# Patient Record
Sex: FEMALE | Race: WHITE | Employment: UNEMPLOYED | URBAN - METROPOLITAN AREA
[De-identification: names, ages, dates, MRNs, and addresses within clinical notes are randomized per-mention and may not be internally consistent; named-entity substitution may affect disease eponyms.]

---

## 2017-01-18 ENCOUNTER — OFFICE VISIT (OUTPATIENT)
Dept: FAMILY MEDICINE CLINIC | Facility: CLINIC | Age: 47
End: 2017-01-18

## 2017-01-18 VITALS
WEIGHT: 202 LBS | HEART RATE: 84 BPM | BODY MASS INDEX: 30.26 KG/M2 | HEIGHT: 68.5 IN | DIASTOLIC BLOOD PRESSURE: 78 MMHG | SYSTOLIC BLOOD PRESSURE: 122 MMHG | RESPIRATION RATE: 16 BRPM

## 2017-01-18 DIAGNOSIS — E66.9 OBESITY (BMI 30-39.9): ICD-10-CM

## 2017-01-18 DIAGNOSIS — R73.03 PREDIABETES: ICD-10-CM

## 2017-01-18 DIAGNOSIS — Z51.81 ENCOUNTER FOR THERAPEUTIC DRUG MONITORING: Primary | ICD-10-CM

## 2017-01-18 PROCEDURE — 99213 OFFICE O/P EST LOW 20 MIN: CPT | Performed by: INTERNAL MEDICINE

## 2017-01-18 RX ORDER — PHENTERMINE HYDROCHLORIDE 37.5 MG/1
37.5 TABLET ORAL
Qty: 30 TABLET | Refills: 1 | Status: SHIPPED | OUTPATIENT
Start: 2017-01-18 | End: 2017-05-15

## 2017-01-18 NOTE — PROGRESS NOTES
CC: Patient presents with:  Weight Check: up 8 lb       HPI:   Obesity, doing well on phentermine, topamax and metformin. No chest pain.        Current Outpatient Prescriptions:  Phentermine HCl 37.5 MG Oral Tab Take 1 tablet (37.5 mg total) by mouth restart phentermine 37.5mg and give 2 months worth due to problems with getting her in. Will cont topamax and metformin. In 2 months will reduce phentermine to 15mg and consider increasing metformin to 3 tabs.      The patient indicates understanding of the

## 2017-02-15 RX ORDER — TOPIRAMATE 50 MG/1
TABLET, FILM COATED ORAL
Qty: 180 TABLET | Refills: 1 | Status: SHIPPED | OUTPATIENT
Start: 2017-02-15 | End: 2017-06-14

## 2017-02-15 NOTE — TELEPHONE ENCOUNTER
Requesting Topiramate for [facility]  LOV: 1/18/17   RTC: 2m  Last Labs: 5/6/16  Filled: 8/5/16 #60 with 5 refills    Future Appointments  Date Time Provider Fred Hong   3/1/2017 9:00 AM Kaitlin Ibarra MD [facility] [address]   5/15/2017 5:00 PM Kaitlin Ibarra

## 2017-03-01 ENCOUNTER — OFFICE VISIT (OUTPATIENT)
Dept: FAMILY MEDICINE CLINIC | Facility: CLINIC | Age: 47
End: 2017-03-01

## 2017-03-01 VITALS
HEIGHT: 68.5 IN | WEIGHT: 196 LBS | RESPIRATION RATE: 16 BRPM | DIASTOLIC BLOOD PRESSURE: 80 MMHG | HEART RATE: 78 BPM | SYSTOLIC BLOOD PRESSURE: 122 MMHG | BODY MASS INDEX: 29.36 KG/M2

## 2017-03-01 DIAGNOSIS — Z51.81 ENCOUNTER FOR THERAPEUTIC DRUG MONITORING: Primary | ICD-10-CM

## 2017-03-01 DIAGNOSIS — R73.03 PREDIABETES: ICD-10-CM

## 2017-03-01 DIAGNOSIS — E66.9 OBESITY (BMI 30-39.9): ICD-10-CM

## 2017-03-01 PROCEDURE — 99213 OFFICE O/P EST LOW 20 MIN: CPT | Performed by: INTERNAL MEDICINE

## 2017-03-01 RX ORDER — METFORMIN HYDROCHLORIDE 750 MG/1
2250 TABLET, EXTENDED RELEASE ORAL
Qty: 90 TABLET | Refills: 5 | Status: SHIPPED | OUTPATIENT
Start: 2017-03-01 | End: 2017-05-15

## 2017-03-01 RX ORDER — PHENTERMINE HYDROCHLORIDE 15 MG/1
15 CAPSULE ORAL EVERY MORNING
Qty: 30 CAPSULE | Refills: 2 | Status: SHIPPED | OUTPATIENT
Start: 2017-03-01 | End: 2017-06-14

## 2017-03-01 NOTE — PROGRESS NOTES
CC: Patient presents with:  Weight Check: down 6 lb       HPI:   Obesity, doing well on phentermine and metformin, no chest pain. Working out well.        Current Outpatient Prescriptions:  Phentermine HCl 15 MG Oral Cap Take 1 capsule (15 mg total) by PLAN:  1. Obesity/prediabetes, pt with 6 lbs wt loss in 3 months of phentemrine, topamax and metformin. Total wt loss of 39 lbs.  Pt with prediabetes, doing well on metformin. Will reduce phentermine to 15mg and refill for 3 months.   Will cont topamax,

## 2017-05-15 ENCOUNTER — OFFICE VISIT (OUTPATIENT)
Dept: FAMILY MEDICINE CLINIC | Facility: CLINIC | Age: 47
End: 2017-05-15

## 2017-05-15 VITALS
DIASTOLIC BLOOD PRESSURE: 88 MMHG | HEIGHT: 68.5 IN | TEMPERATURE: 98 F | HEART RATE: 72 BPM | SYSTOLIC BLOOD PRESSURE: 130 MMHG | BODY MASS INDEX: 30.54 KG/M2 | WEIGHT: 203.81 LBS | RESPIRATION RATE: 16 BRPM

## 2017-05-15 DIAGNOSIS — R73.03 PREDIABETES: ICD-10-CM

## 2017-05-15 DIAGNOSIS — E66.9 OBESITY (BMI 30-39.9): ICD-10-CM

## 2017-05-15 DIAGNOSIS — E78.00 PURE HYPERCHOLESTEROLEMIA: ICD-10-CM

## 2017-05-15 DIAGNOSIS — E55.9 VITAMIN D DEFICIENCY: ICD-10-CM

## 2017-05-15 DIAGNOSIS — Z01.89 ROUTINE LAB DRAW: ICD-10-CM

## 2017-05-15 DIAGNOSIS — Z51.81 ENCOUNTER FOR THERAPEUTIC DRUG MONITORING: Primary | ICD-10-CM

## 2017-05-15 PROCEDURE — 99213 OFFICE O/P EST LOW 20 MIN: CPT | Performed by: INTERNAL MEDICINE

## 2017-05-15 RX ORDER — METFORMIN HYDROCHLORIDE 750 MG/1
1500 TABLET, EXTENDED RELEASE ORAL
COMMUNITY
End: 2017-11-08 | Stop reason: ALTCHOICE

## 2017-05-15 RX ORDER — PHENTERMINE HYDROCHLORIDE 37.5 MG/1
37.5 TABLET ORAL
Qty: 30 TABLET | Refills: 0 | Status: SHIPPED | OUTPATIENT
Start: 2017-05-15 | End: 2017-06-14

## 2017-05-15 NOTE — PROGRESS NOTES
CC: Patient presents with:  Weight Check: up 7lb - Patient has started taking Metformin 750mg 2 tabs since 5/8/17 as she was experiencing diarrhea since starting the 3 tabs daily. HPI:   Obesity, doing well phentermine, topamax and metfomrin.  Steven File 5/15/2018  Lipid Panel  Standing Status: Future  Standing Expiration Date: 5/15/2018  Urinalysis, Routine  Standing Status: Future  Standing Expiration Date: 5/15/2018  Vitamin D, 25-Hydroxy  Standing Status: Future  Standing Expiration Date: 5/15/2018  Vi

## 2017-05-18 ENCOUNTER — PRIOR ORIGINAL RECORDS (OUTPATIENT)
Dept: OTHER | Age: 47
End: 2017-05-18

## 2017-05-18 ENCOUNTER — LAB ENCOUNTER (OUTPATIENT)
Dept: LAB | Age: 47
End: 2017-05-18
Attending: INTERNAL MEDICINE
Payer: COMMERCIAL

## 2017-05-18 DIAGNOSIS — E55.9 VITAMIN D DEFICIENCY: ICD-10-CM

## 2017-05-18 DIAGNOSIS — R73.03 PREDIABETES: ICD-10-CM

## 2017-05-18 DIAGNOSIS — E78.00 PURE HYPERCHOLESTEROLEMIA: ICD-10-CM

## 2017-05-18 DIAGNOSIS — E66.9 OBESITY (BMI 30-39.9): ICD-10-CM

## 2017-05-18 DIAGNOSIS — Z51.81 ENCOUNTER FOR THERAPEUTIC DRUG MONITORING: ICD-10-CM

## 2017-05-18 DIAGNOSIS — Z01.89 ROUTINE LAB DRAW: ICD-10-CM

## 2017-05-18 PROCEDURE — 82306 VITAMIN D 25 HYDROXY: CPT | Performed by: INTERNAL MEDICINE

## 2017-05-18 PROCEDURE — 81003 URINALYSIS AUTO W/O SCOPE: CPT | Performed by: INTERNAL MEDICINE

## 2017-05-18 PROCEDURE — 82607 VITAMIN B-12: CPT | Performed by: INTERNAL MEDICINE

## 2017-05-18 PROCEDURE — 36415 COLL VENOUS BLD VENIPUNCTURE: CPT | Performed by: INTERNAL MEDICINE

## 2017-05-18 PROCEDURE — 80061 LIPID PANEL: CPT | Performed by: INTERNAL MEDICINE

## 2017-05-18 PROCEDURE — 80050 GENERAL HEALTH PANEL: CPT | Performed by: INTERNAL MEDICINE

## 2017-05-24 LAB
ALBUMIN: 3.8 G/DL
ALKALINE PHOSPHATATE(ALK PHOS): 52 IU/L
BILIRUBIN TOTAL: 0.4 MG/DL
BUN: 13 MG/DL
CALCIUM: 9.5 MG/DL
CHLORIDE: 104 MEQ/L
CHOLESTEROL, TOTAL: 244 MG/DL
CREATININE, SERUM: 1.05 MG/DL
GLUCOSE: 88 MG/DL
HDL CHOLESTEROL: 62 MG/DL
LDL CHOLESTEROL: 139 MG/DL
POTASSIUM, SERUM: 4.1 MEQ/L
PROTEIN, TOTAL: 7.4 G/DL
SGOT (AST): 30 IU/L
SGPT (ALT): 48 IU/L
SODIUM: 138 MEQ/L
TRIGLYCERIDES: 214 MG/DL

## 2017-05-29 NOTE — PROGRESS NOTES
Quick Note:    Low vit D, start vit D 50,000 U once weekly for 6 months.  Low B12, high lipids, will discuss next ov.  ______

## 2017-05-30 ENCOUNTER — PRIOR ORIGINAL RECORDS (OUTPATIENT)
Dept: OTHER | Age: 47
End: 2017-05-30

## 2017-05-31 RX ORDER — ERGOCALCIFEROL 1.25 MG/1
50000 CAPSULE ORAL WEEKLY
Qty: 24 CAPSULE | Refills: 0 | Status: SHIPPED | OUTPATIENT
Start: 2017-05-31 | End: 2017-06-30

## 2017-06-01 ENCOUNTER — MED REC SCAN ONLY (OUTPATIENT)
Dept: FAMILY MEDICINE CLINIC | Facility: CLINIC | Age: 47
End: 2017-06-01

## 2017-06-13 ENCOUNTER — TELEPHONE (OUTPATIENT)
Dept: FAMILY MEDICINE CLINIC | Facility: CLINIC | Age: 47
End: 2017-06-13

## 2017-06-14 ENCOUNTER — OFFICE VISIT (OUTPATIENT)
Dept: FAMILY MEDICINE CLINIC | Facility: CLINIC | Age: 47
End: 2017-06-14

## 2017-06-14 ENCOUNTER — LAB ENCOUNTER (OUTPATIENT)
Dept: LAB | Age: 47
End: 2017-06-14
Attending: INTERNAL MEDICINE
Payer: COMMERCIAL

## 2017-06-14 VITALS
WEIGHT: 196 LBS | HEART RATE: 84 BPM | BODY MASS INDEX: 29.36 KG/M2 | DIASTOLIC BLOOD PRESSURE: 78 MMHG | SYSTOLIC BLOOD PRESSURE: 122 MMHG | RESPIRATION RATE: 16 BRPM | HEIGHT: 68.5 IN

## 2017-06-14 DIAGNOSIS — E66.9 OBESITY (BMI 30-39.9): ICD-10-CM

## 2017-06-14 DIAGNOSIS — E53.8 VITAMIN B 12 DEFICIENCY: ICD-10-CM

## 2017-06-14 DIAGNOSIS — R73.03 PREDIABETES: ICD-10-CM

## 2017-06-14 DIAGNOSIS — E78.00 PURE HYPERCHOLESTEROLEMIA: ICD-10-CM

## 2017-06-14 DIAGNOSIS — E78.00 PURE HYPERCHOLESTEROLEMIA: Primary | ICD-10-CM

## 2017-06-14 DIAGNOSIS — E55.9 VITAMIN D DEFICIENCY: ICD-10-CM

## 2017-06-14 DIAGNOSIS — Z51.81 ENCOUNTER FOR THERAPEUTIC DRUG MONITORING: Primary | ICD-10-CM

## 2017-06-14 PROCEDURE — 99214 OFFICE O/P EST MOD 30 MIN: CPT | Performed by: INTERNAL MEDICINE

## 2017-06-14 PROCEDURE — 96372 THER/PROPH/DIAG INJ SC/IM: CPT | Performed by: INTERNAL MEDICINE

## 2017-06-14 RX ORDER — CYANOCOBALAMIN 1000 UG/ML
1000 INJECTION INTRAMUSCULAR; SUBCUTANEOUS ONCE
Status: COMPLETED | OUTPATIENT
Start: 2017-06-14 | End: 2017-06-14

## 2017-06-14 RX ORDER — PHENTERMINE HYDROCHLORIDE 37.5 MG/1
37.5 TABLET ORAL
Qty: 30 TABLET | Refills: 0 | Status: SHIPPED | OUTPATIENT
Start: 2017-06-14 | End: 2017-07-14

## 2017-06-14 RX ADMIN — CYANOCOBALAMIN 1000 MCG: 1000 INJECTION INTRAMUSCULAR; SUBCUTANEOUS at 08:39:00

## 2017-06-14 NOTE — PROGRESS NOTES
CC: Patient presents with:  Weight Check: down 7 lb       HPI:   1. Obesity/prediabetes, pt doing well on phentermine, topamax and metformin. 2. Vitamin B 12 deficiency, she feels fine  3. Vitamin D deficiency, doing well on vit D  4.  Pure hypercho diagnosis)  Obesity (BMI 30-39. 9)  Vitamin B 12 deficiency  Vitamin D deficiency  Prediabetes  Pure hypercholesterolemia     PLAN:  1. Obesity/prediabetes, pt with 7 lbs wt loss in 1 month of phentermine 37.5mg, topamax and metformin.  Total wt loss of 39 l

## 2017-06-28 ENCOUNTER — TELEPHONE (OUTPATIENT)
Dept: FAMILY MEDICINE CLINIC | Facility: CLINIC | Age: 47
End: 2017-06-28

## 2017-06-28 NOTE — TELEPHONE ENCOUNTER
Singulex results received. Per Dr. Sr Shell: lipids okay.      Future Appointments  Date Time Provider Fred Hong   7/14/2017 2:15 PM Alexander Grewal MD [facility] [address]   8/11/2017 2:15 PM Alexander Grewal MD [facility] [address]   9/18/2017 1:00 PM Nova

## 2017-07-14 ENCOUNTER — OFFICE VISIT (OUTPATIENT)
Dept: INTERNAL MEDICINE CLINIC | Facility: CLINIC | Age: 47
End: 2017-07-14

## 2017-07-14 VITALS
HEIGHT: 68.5 IN | DIASTOLIC BLOOD PRESSURE: 80 MMHG | WEIGHT: 199 LBS | RESPIRATION RATE: 16 BRPM | HEART RATE: 78 BPM | SYSTOLIC BLOOD PRESSURE: 122 MMHG | BODY MASS INDEX: 29.81 KG/M2

## 2017-07-14 DIAGNOSIS — E66.9 OBESITY (BMI 30-39.9): ICD-10-CM

## 2017-07-14 DIAGNOSIS — E78.00 PURE HYPERCHOLESTEROLEMIA: ICD-10-CM

## 2017-07-14 DIAGNOSIS — E53.8 VITAMIN B12 DEFICIENCY: ICD-10-CM

## 2017-07-14 DIAGNOSIS — I10 HTN, GOAL BELOW 140/90: ICD-10-CM

## 2017-07-14 DIAGNOSIS — R73.03 PREDIABETES: ICD-10-CM

## 2017-07-14 DIAGNOSIS — Z51.81 ENCOUNTER FOR THERAPEUTIC DRUG MONITORING: Primary | ICD-10-CM

## 2017-07-14 PROCEDURE — 99214 OFFICE O/P EST MOD 30 MIN: CPT | Performed by: INTERNAL MEDICINE

## 2017-07-14 PROCEDURE — 96372 THER/PROPH/DIAG INJ SC/IM: CPT | Performed by: INTERNAL MEDICINE

## 2017-07-14 RX ORDER — CYANOCOBALAMIN 1000 UG/ML
1000 INJECTION INTRAMUSCULAR; SUBCUTANEOUS ONCE
Status: COMPLETED | OUTPATIENT
Start: 2017-07-14 | End: 2017-07-14

## 2017-07-14 RX ORDER — VALSARTAN 160 MG/1
160 TABLET ORAL DAILY
Qty: 90 TABLET | Refills: 3 | Status: SHIPPED | OUTPATIENT
Start: 2017-07-14 | End: 2017-08-11

## 2017-07-14 RX ORDER — PHENTERMINE HYDROCHLORIDE 37.5 MG/1
37.5 TABLET ORAL
Qty: 30 TABLET | Refills: 0 | Status: SHIPPED | OUTPATIENT
Start: 2017-07-14 | End: 2017-11-08

## 2017-07-14 RX ADMIN — CYANOCOBALAMIN 1000 MCG: 1000 INJECTION INTRAMUSCULAR; SUBCUTANEOUS at 15:39:00

## 2017-07-14 NOTE — PROGRESS NOTES
CC: Patient presents with:  Weight Check: up 3 lb       HPI:   1. Obesity/prediabetes, pt doing well on phentermine, topamax and metformin. No chest pain. 2. Vitamin B 12 deficiency, doing well on B12 shots  3.  Pure hypercholesterolemia, went for l mouth daily. valsartan (DIOVAN) 160 MG Oral Tab 90 tablet 3      Sig: Take 1 tablet (160 mg total) by mouth daily. None     ASSESSMENT:   Encounter for therapeutic drug monitoring  (primary encounter diagnosis)  Obesity (BMI 30-39. 9)  Vitamin

## 2017-08-11 ENCOUNTER — OFFICE VISIT (OUTPATIENT)
Dept: INTERNAL MEDICINE CLINIC | Facility: CLINIC | Age: 47
End: 2017-08-11

## 2017-08-11 VITALS
BODY MASS INDEX: 30.26 KG/M2 | RESPIRATION RATE: 16 BRPM | HEART RATE: 84 BPM | HEIGHT: 68.5 IN | DIASTOLIC BLOOD PRESSURE: 82 MMHG | SYSTOLIC BLOOD PRESSURE: 122 MMHG | WEIGHT: 202 LBS

## 2017-08-11 DIAGNOSIS — I10 HTN, GOAL BELOW 140/90: ICD-10-CM

## 2017-08-11 DIAGNOSIS — R73.03 PREDIABETES: ICD-10-CM

## 2017-08-11 DIAGNOSIS — E66.9 OBESITY (BMI 30-39.9): ICD-10-CM

## 2017-08-11 DIAGNOSIS — Z51.81 ENCOUNTER FOR THERAPEUTIC DRUG MONITORING: Primary | ICD-10-CM

## 2017-08-11 DIAGNOSIS — E53.8 LOW VITAMIN B12 LEVEL: ICD-10-CM

## 2017-08-11 PROCEDURE — 99214 OFFICE O/P EST MOD 30 MIN: CPT | Performed by: INTERNAL MEDICINE

## 2017-08-11 PROCEDURE — 96372 THER/PROPH/DIAG INJ SC/IM: CPT | Performed by: INTERNAL MEDICINE

## 2017-08-11 RX ORDER — PHENTERMINE HYDROCHLORIDE 15 MG/1
15 CAPSULE ORAL EVERY MORNING
Qty: 30 CAPSULE | Refills: 2 | Status: SHIPPED | OUTPATIENT
Start: 2017-08-11 | End: 2017-11-08

## 2017-08-11 RX ORDER — PHENTERMINE HYDROCHLORIDE 37.5 MG/1
37.5 TABLET ORAL
Qty: 30 TABLET | Refills: 0 | Status: CANCELLED | OUTPATIENT
Start: 2017-08-11

## 2017-08-11 RX ORDER — ERGOCALCIFEROL 1.25 MG/1
1 CAPSULE ORAL WEEKLY
Refills: 0 | COMMUNITY
Start: 2017-07-21 | End: 2018-02-05

## 2017-08-11 RX ORDER — CYANOCOBALAMIN 1000 UG/ML
1000 INJECTION INTRAMUSCULAR; SUBCUTANEOUS ONCE
Status: COMPLETED | OUTPATIENT
Start: 2017-08-11 | End: 2017-08-11

## 2017-08-11 RX ADMIN — CYANOCOBALAMIN 1000 MCG: 1000 INJECTION INTRAMUSCULAR; SUBCUTANEOUS at 15:03:00

## 2017-08-11 NOTE — PROGRESS NOTES
CC: Patient presents with:  Weight Check: up 3 lbs       HPI:   1. Obesity/prediabetes, pt doing well on phentermine, topamax and metformin. Working out well. 2. Vitamin B 12 deficiency, doing well on B12 shots  3.  HTN, doing well on diovan, no diz tablet by mouth daily. Phentermine HCl 15 MG Oral Cap 30 capsule 2      Sig: Take 1 capsule (15 mg total) by mouth every morning.           None     ASSESSMENT:   Encounter for therapeutic drug monitoring  (primary encounter diagnosis)  Obesity (BMI 30

## 2017-08-17 RX ORDER — TOPIRAMATE 50 MG/1
TABLET, FILM COATED ORAL
Qty: 180 TABLET | Refills: 1 | Status: SHIPPED | OUTPATIENT
Start: 2017-08-17 | End: 2017-11-08 | Stop reason: ALTCHOICE

## 2017-08-17 NOTE — TELEPHONE ENCOUNTER
Requesting topiramate  LOV: 8/11/17  RTC: 3 months  Last Labs: n/a  Filled: 2/15/17 #180 with 1 refill  Future Appointments  Date Time Provider Fred Hong   9/8/2017 11:30 AM [facility] 20 NURSE [facility] [address] [facility] [address]   10/6/2017 11:30 AM EM[facility] 20 NURSE [facility] 20

## 2017-08-23 RX ORDER — METFORMIN HYDROCHLORIDE 750 MG/1
1500 TABLET, EXTENDED RELEASE ORAL
Qty: 180 TABLET | Refills: 1 | Status: SHIPPED | OUTPATIENT
Start: 2017-08-23 | End: 2017-11-08

## 2017-08-23 NOTE — TELEPHONE ENCOUNTER
Diabetic Medication Protocol Failed8/21 12:55 AM   HgBA1C procedure resulted in past 6 months    Last HgBA1C < 7.5    Microalbumin procedure in past 12 months or taking ACE/ARB     Requesting Metformin   LOV: 8/11/17  RTC: 3 mo   Last Labs: charla   Filled: 3

## 2017-09-11 ENCOUNTER — NURSE ONLY (OUTPATIENT)
Dept: FAMILY MEDICINE CLINIC | Facility: CLINIC | Age: 47
End: 2017-09-11

## 2017-09-11 DIAGNOSIS — E53.8 VITAMIN B12 DEFICIENCY: ICD-10-CM

## 2017-09-11 PROCEDURE — 96372 THER/PROPH/DIAG INJ SC/IM: CPT | Performed by: INTERNAL MEDICINE

## 2017-09-11 RX ORDER — CYANOCOBALAMIN 1000 UG/ML
1000 INJECTION INTRAMUSCULAR; SUBCUTANEOUS ONCE
Status: COMPLETED | OUTPATIENT
Start: 2017-09-11 | End: 2017-09-11

## 2017-09-11 RX ADMIN — CYANOCOBALAMIN 1000 MCG: 1000 INJECTION INTRAMUSCULAR; SUBCUTANEOUS at 13:24:00

## 2017-09-11 NOTE — PROGRESS NOTES
Patient is here for B12. Given IM left deltoid. Patient tolerated well. All questions answered.      Future Appointments  Date Time Provider Fred Gely   10/10/2017 11:30 AM [facility] 20 NURSE [facility] [address] [facility] [address]   11/8/2017 12:40 PM Francisco Winters MD [facility] 2

## 2017-10-10 ENCOUNTER — NURSE ONLY (OUTPATIENT)
Dept: FAMILY MEDICINE CLINIC | Facility: CLINIC | Age: 47
End: 2017-10-10

## 2017-10-10 PROCEDURE — 96372 THER/PROPH/DIAG INJ SC/IM: CPT | Performed by: INTERNAL MEDICINE

## 2017-10-10 RX ORDER — CYANOCOBALAMIN 1000 UG/ML
1000 INJECTION INTRAMUSCULAR; SUBCUTANEOUS
Status: SHIPPED | OUTPATIENT
Start: 2017-10-10 | End: 2017-12-09

## 2017-10-10 RX ADMIN — CYANOCOBALAMIN 1000 MCG: 1000 INJECTION INTRAMUSCULAR; SUBCUTANEOUS at 11:20:00

## 2017-10-10 NOTE — PROGRESS NOTES
Notes Recorded by Denny Leach MD on 5/29/2017 at 1:21 PM  Low vit D, start vit D 50,000 U once weekly for 6 months. Low B12, high lipids, will discuss next ov.     Ref Range & Units 5/18/17  8:30 AM   Vitamin B12 193 - 986 pg/mL 324            #1 6/14/1

## 2017-11-08 ENCOUNTER — OFFICE VISIT (OUTPATIENT)
Dept: FAMILY MEDICINE CLINIC | Facility: CLINIC | Age: 47
End: 2017-11-08

## 2017-11-08 VITALS
TEMPERATURE: 99 F | WEIGHT: 202 LBS | RESPIRATION RATE: 18 BRPM | HEART RATE: 101 BPM | OXYGEN SATURATION: 99 % | HEIGHT: 69 IN | BODY MASS INDEX: 29.92 KG/M2 | SYSTOLIC BLOOD PRESSURE: 112 MMHG | DIASTOLIC BLOOD PRESSURE: 78 MMHG

## 2017-11-08 DIAGNOSIS — E66.9 OBESITY (BMI 30-39.9): ICD-10-CM

## 2017-11-08 DIAGNOSIS — E55.9 VITAMIN D DEFICIENCY: ICD-10-CM

## 2017-11-08 DIAGNOSIS — R73.03 PREDIABETES: Primary | ICD-10-CM

## 2017-11-08 PROCEDURE — 99214 OFFICE O/P EST MOD 30 MIN: CPT | Performed by: FAMILY MEDICINE

## 2017-11-08 RX ORDER — EMPAGLIFLOZIN 25 MG/1
TABLET, FILM COATED ORAL
Refills: 4 | COMMUNITY
Start: 2017-11-05 | End: 2017-11-08

## 2017-11-08 RX ORDER — FLUTICASONE PROPIONATE 50 MCG
SPRAY, SUSPENSION (ML) NASAL
Refills: 0 | COMMUNITY
Start: 2017-11-07 | End: 2018-02-05 | Stop reason: ALTCHOICE

## 2017-11-08 RX ORDER — ONDANSETRON 4 MG/1
TABLET, ORALLY DISINTEGRATING ORAL
Refills: 0 | COMMUNITY
Start: 2017-11-07 | End: 2018-02-05 | Stop reason: ALTCHOICE

## 2017-11-08 RX ORDER — VALSARTAN 160 MG/1
160 TABLET ORAL DAILY
Refills: 2 | COMMUNITY
Start: 2017-11-05 | End: 2018-11-02 | Stop reason: ALTCHOICE

## 2017-11-08 NOTE — PROGRESS NOTES
HPI:    Patient ID: Adamaris Martini is a 41year old female. HPI  Mrs. Mk Pérez is a pleasant 44-year-old female with history of hypertension, prediabetes, and obesity here today for a scheduled follow-up.   She is able to maintain her weight at this point She has no cervical adenopathy. Neurological: She is alert. Psychiatric: She has a normal mood and affect. Her behavior is normal.   Vitals reviewed. ASSESSMENT/PLAN:   Prediabetes  (primary encounter diagnosis)  Obesity (bmi 30-39. 9)  Oz

## 2017-12-06 ENCOUNTER — TELEPHONE (OUTPATIENT)
Dept: FAMILY MEDICINE CLINIC | Facility: CLINIC | Age: 47
End: 2017-12-06

## 2017-12-12 ENCOUNTER — PRIOR ORIGINAL RECORDS (OUTPATIENT)
Dept: OTHER | Age: 47
End: 2017-12-12

## 2018-01-26 ENCOUNTER — LAB ENCOUNTER (OUTPATIENT)
Dept: LAB | Age: 48
End: 2018-01-26
Attending: FAMILY MEDICINE
Payer: COMMERCIAL

## 2018-01-26 ENCOUNTER — PRIOR ORIGINAL RECORDS (OUTPATIENT)
Dept: OTHER | Age: 48
End: 2018-01-26

## 2018-01-26 DIAGNOSIS — E55.9 VITAMIN D DEFICIENCY: Primary | ICD-10-CM

## 2018-01-26 DIAGNOSIS — E78.01 FAMILIAL HYPERCHOLESTEROLEMIA: ICD-10-CM

## 2018-01-26 DIAGNOSIS — E55.9 VITAMIN D DEFICIENCY: ICD-10-CM

## 2018-01-26 DIAGNOSIS — E78.00 PURE HYPERCHOLESTEROLEMIA: Primary | ICD-10-CM

## 2018-01-26 DIAGNOSIS — R73.03 PREDIABETES: ICD-10-CM

## 2018-01-26 LAB
25-HYDROXYVITAMIN D (TOTAL): 28.8 NG/ML (ref 30–100)
ALBUMIN SERPL-MCNC: 3.8 G/DL (ref 3.5–4.8)
ALP LIVER SERPL-CCNC: 56 U/L (ref 37–98)
ALT SERPL-CCNC: 27 U/L (ref 14–54)
AST SERPL-CCNC: 10 U/L (ref 15–41)
BILIRUB SERPL-MCNC: 0.4 MG/DL (ref 0.1–2)
BUN BLD-MCNC: 20 MG/DL (ref 8–20)
CALCIUM BLD-MCNC: 9 MG/DL (ref 8.3–10.3)
CHLORIDE: 109 MMOL/L (ref 101–111)
CHOLEST SMN-MCNC: 246 MG/DL (ref ?–200)
CO2: 26 MMOL/L (ref 22–32)
CREAT BLD-MCNC: 0.97 MG/DL (ref 0.55–1.02)
EST. AVERAGE GLUCOSE BLD GHB EST-MCNC: 111 MG/DL (ref 68–126)
GLUCOSE BLD-MCNC: 92 MG/DL (ref 70–99)
HBA1C MFR BLD HPLC: 5.5 % (ref ?–5.7)
HDLC SERPL-MCNC: 59 MG/DL (ref 45–?)
HDLC SERPL: 4.17 {RATIO} (ref ?–4.44)
LDLC SERPL CALC-MCNC: 159 MG/DL (ref ?–130)
M PROTEIN MFR SERPL ELPH: 7.7 G/DL (ref 6.1–8.3)
NONHDLC SERPL-MCNC: 187 MG/DL (ref ?–130)
POTASSIUM SERPL-SCNC: 4.3 MMOL/L (ref 3.6–5.1)
SODIUM SERPL-SCNC: 142 MMOL/L (ref 136–144)
TRIGL SERPL-MCNC: 140 MG/DL (ref ?–150)
VLDLC SERPL CALC-MCNC: 28 MG/DL (ref 5–40)

## 2018-01-26 PROCEDURE — 80061 LIPID PANEL: CPT | Performed by: FAMILY MEDICINE

## 2018-01-26 PROCEDURE — 83036 HEMOGLOBIN GLYCOSYLATED A1C: CPT | Performed by: FAMILY MEDICINE

## 2018-01-26 PROCEDURE — 36415 COLL VENOUS BLD VENIPUNCTURE: CPT | Performed by: FAMILY MEDICINE

## 2018-01-26 PROCEDURE — 80053 COMPREHEN METABOLIC PANEL: CPT | Performed by: FAMILY MEDICINE

## 2018-01-26 PROCEDURE — 82306 VITAMIN D 25 HYDROXY: CPT | Performed by: FAMILY MEDICINE

## 2018-01-26 RX ORDER — ERGOCALCIFEROL 1.25 MG/1
50000 CAPSULE ORAL WEEKLY
Qty: 8 CAPSULE | Refills: 0 | Status: SHIPPED | OUTPATIENT
Start: 2018-01-26 | End: 2018-02-25

## 2018-01-30 LAB
ALBUMIN: 3.8 G/DL
ALKALINE PHOSPHATATE(ALK PHOS): 56 IU/L
ALT (SGPT): 27 U/L
AST (SGOT): 10 U/L
BILIRUBIN TOTAL: 0.4 MG/DL
BUN: 20 MG/DL
CALCIUM: 9 MG/DL
CHLORIDE: 109 MEQ/L
CHOLESTEROL, TOTAL: 246 MG/DL
CREATININE, SERUM: 0.97 MG/DL
GLUCOSE: 92 MG/DL
GLUCOSE: 92 MG/DL
HDL CHOLESTEROL: 59 MG/DL
HEMOGLOBIN A1C: 5.5 %
LDL CHOLESTEROL: 159 MG/DL
NON-HDL CHOLESTEROL: 187 MG/DL
POTASSIUM, SERUM: 4.3 MEQ/L
PROTEIN, TOTAL: 7.7 G/DL
SGOT (AST): 10 IU/L
SGPT (ALT): 27 IU/L
SODIUM: 142 MEQ/L
TOTAL CHOLESTEROL / HDL RATIO: 4.17 RATIO UN
TRIGLYCERIDES: 140 MG/DL

## 2018-01-31 ENCOUNTER — PRIOR ORIGINAL RECORDS (OUTPATIENT)
Dept: OTHER | Age: 48
End: 2018-01-31

## 2018-01-31 RX ORDER — EMPAGLIFLOZIN 25 MG/1
1 TABLET, FILM COATED ORAL DAILY
Qty: 30 TABLET | Refills: 5 | OUTPATIENT
Start: 2018-01-31 | End: 2018-03-02

## 2018-02-05 ENCOUNTER — OFFICE VISIT (OUTPATIENT)
Dept: FAMILY MEDICINE CLINIC | Facility: CLINIC | Age: 48
End: 2018-02-05

## 2018-02-05 VITALS
HEIGHT: 69 IN | RESPIRATION RATE: 16 BRPM | BODY MASS INDEX: 31.55 KG/M2 | WEIGHT: 213 LBS | SYSTOLIC BLOOD PRESSURE: 120 MMHG | DIASTOLIC BLOOD PRESSURE: 80 MMHG | HEART RATE: 70 BPM | TEMPERATURE: 98 F

## 2018-02-05 DIAGNOSIS — E66.9 OBESITY (BMI 30-39.9): Primary | ICD-10-CM

## 2018-02-05 DIAGNOSIS — E55.9 VITAMIN D DEFICIENCY: ICD-10-CM

## 2018-02-05 DIAGNOSIS — Z12.31 ENCOUNTER FOR SCREENING MAMMOGRAM FOR BREAST CANCER: ICD-10-CM

## 2018-02-05 DIAGNOSIS — I10 HTN, GOAL BELOW 140/90: ICD-10-CM

## 2018-02-05 PROCEDURE — 99214 OFFICE O/P EST MOD 30 MIN: CPT | Performed by: FAMILY MEDICINE

## 2018-02-05 RX ORDER — ATORVASTATIN CALCIUM 20 MG/1
20 TABLET, FILM COATED ORAL NIGHTLY
Refills: 1 | COMMUNITY
Start: 2018-01-31

## 2018-02-05 RX ORDER — PHENTERMINE HYDROCHLORIDE 37.5 MG/1
37.5 TABLET ORAL
Qty: 30 TABLET | Refills: 2 | Status: SHIPPED | OUTPATIENT
Start: 2018-02-05 | End: 2018-05-05

## 2018-02-05 NOTE — PATIENT INSTRUCTIONS
Thank you for choosing Morenita Berger MD at [place] [address]  To Do: Glendy Griggs  1. Please take meds as directed  Effective 6/19/17 until November 2017  Due to [employer] is being moved.   It is inside the [facility] is your duty and for your safety to discuss with the pharmacist and our office with questions, and to notify us and stop treatment if problems arise, but know that our intention is that the benefits outweigh those potential risks and we strive to make you

## 2018-02-05 NOTE — PROGRESS NOTES
HPI:    Patient ID: Bari Aguilera is a 41year old female. HPI  Ms. Noreen Abdi is a pleasant 53-year-old female who has history of hypertension, dyslipidemia recently started on atorvastatin, obesity following up for weight loss management.   She had labor mg by mouth nightly. at bedtime Disp:  Rfl: 1     Allergies:No Known Allergies   PHYSICAL EXAM:   Physical Exam   Constitutional: No distress. HENT:   Mouth/Throat: Oropharynx is clear and moist. No oropharyngeal exudate.    Eyes: Conjunctivae are normal.

## 2018-02-19 ENCOUNTER — PRIOR ORIGINAL RECORDS (OUTPATIENT)
Dept: OTHER | Age: 48
End: 2018-02-19

## 2018-02-19 ENCOUNTER — HOSPITAL ENCOUNTER (OUTPATIENT)
Dept: CT IMAGING | Facility: HOSPITAL | Age: 48
Discharge: HOME | End: 2018-02-19
Attending: INTERNAL MEDICINE

## 2018-02-19 DIAGNOSIS — Z13.6 ENCOUNTER FOR SPECIAL SCREENING EXAMINATION FOR CARDIOVASCULAR DISORDER: ICD-10-CM

## 2018-02-23 LAB — UFCT: 12.64 CA SCORE

## 2018-02-27 ENCOUNTER — PRIOR ORIGINAL RECORDS (OUTPATIENT)
Dept: OTHER | Age: 48
End: 2018-02-27

## 2018-02-27 RX ORDER — TOPIRAMATE 50 MG/1
TABLET, FILM COATED ORAL
Qty: 180 TABLET | Refills: 1 | Status: SHIPPED | OUTPATIENT
Start: 2018-02-27 | End: 2018-05-05

## 2018-02-27 NOTE — TELEPHONE ENCOUNTER
TOPIRAMATE 50 MG TABLET  Will file in chart as: TOPIRAMATE 50 MG Oral Tab  The source prescription was discontinued on 11/8/2017 by Jean Marques CMA for the following reason: Therapy completed.   TAKE 1 TABLET BY MOUTH TWICE A DAY       Disp: 180 tablet

## 2018-02-28 ENCOUNTER — MED REC SCAN ONLY (OUTPATIENT)
Dept: FAMILY MEDICINE CLINIC | Facility: CLINIC | Age: 48
End: 2018-02-28

## 2018-03-02 ENCOUNTER — PRIOR ORIGINAL RECORDS (OUTPATIENT)
Dept: OTHER | Age: 48
End: 2018-03-02

## 2018-05-05 ENCOUNTER — PRIOR ORIGINAL RECORDS (OUTPATIENT)
Dept: OTHER | Age: 48
End: 2018-05-05

## 2018-05-05 ENCOUNTER — OFFICE VISIT (OUTPATIENT)
Dept: FAMILY MEDICINE CLINIC | Facility: CLINIC | Age: 48
End: 2018-05-05

## 2018-05-05 ENCOUNTER — LAB ENCOUNTER (OUTPATIENT)
Dept: LAB | Age: 48
End: 2018-05-05
Attending: INTERNAL MEDICINE
Payer: COMMERCIAL

## 2018-05-05 VITALS
DIASTOLIC BLOOD PRESSURE: 88 MMHG | BODY MASS INDEX: 31.84 KG/M2 | RESPIRATION RATE: 16 BRPM | WEIGHT: 215 LBS | SYSTOLIC BLOOD PRESSURE: 130 MMHG | HEIGHT: 69 IN | HEART RATE: 78 BPM | TEMPERATURE: 98 F

## 2018-05-05 DIAGNOSIS — E55.9 VITAMIN D DEFICIENCY: ICD-10-CM

## 2018-05-05 DIAGNOSIS — E78.01 ESSENTIAL FAMILIAL HYPERCHOLESTEROLEMIA: Primary | ICD-10-CM

## 2018-05-05 DIAGNOSIS — I25.10 CORONARY ARTERY DISEASE INVOLVING NATIVE HEART WITHOUT ANGINA PECTORIS, UNSPECIFIED VESSEL OR LESION TYPE: Primary | ICD-10-CM

## 2018-05-05 DIAGNOSIS — R91.1 PULMONARY NODULE: ICD-10-CM

## 2018-05-05 DIAGNOSIS — I10 ESSENTIAL HYPERTENSION: ICD-10-CM

## 2018-05-05 DIAGNOSIS — E66.9 OBESITY (BMI 30-39.9): ICD-10-CM

## 2018-05-05 DIAGNOSIS — M25.50 ARTHRALGIA, UNSPECIFIED JOINT: ICD-10-CM

## 2018-05-05 PROCEDURE — 82306 VITAMIN D 25 HYDROXY: CPT | Performed by: FAMILY MEDICINE

## 2018-05-05 PROCEDURE — 99214 OFFICE O/P EST MOD 30 MIN: CPT | Performed by: FAMILY MEDICINE

## 2018-05-05 RX ORDER — PHENTERMINE HYDROCHLORIDE 37.5 MG/1
37.5 TABLET ORAL
Qty: 30 TABLET | Refills: 2 | Status: SHIPPED | OUTPATIENT
Start: 2018-05-05 | End: 2018-11-02 | Stop reason: ALTCHOICE

## 2018-05-05 NOTE — PROGRESS NOTES
HPI:    Patient ID: Jose E Rivera is a 41year old female. HPI  Ms. Jennifer Freitas is a pleasant 53-year-old female with history of hypertension, hyperlipidemia who recently had CT cardiac scoring which revealed mild arteriolosclerosis of the coronary artery. by mouth daily. Disp:  Rfl: 2   topiramate 50 MG Oral Tab Take 1 tablet (50 mg total) by mouth 2 (two) times daily. Disp: 60 tablet Rfl: 5   aspirin 81 MG Oral Tab Take 81 mg by mouth daily.  Disp:  Rfl:      Allergies:No Known Allergies   PHYSICAL EXAM: Imaging & Referrals:  RHEUMATOLOGY - INTERNAL  CT CHEST (W+WO) (CPT=71270)       VO#[id number]

## 2018-05-05 NOTE — PATIENT INSTRUCTIONS
Thank you for choosing Elvia Beaver MD at [place] [address]  To Do: Shiva Villa  1. Please take meds as directed  [facility] is located in [address]. Monday, Tuesday & Friday – 8 a.m. to 4 p.m. Wednesday, Thursday – 7 a.m. to 3 p.m. those potential risks and we strive to make you healthier and to improve your quality of life.     Referrals, and Radiology Information:    If your insurance requires a referral to a specialist, please allow 5 business days to process your referral request.

## 2018-05-08 ENCOUNTER — TELEPHONE (OUTPATIENT)
Dept: FAMILY MEDICINE CLINIC | Facility: CLINIC | Age: 48
End: 2018-05-08

## 2018-05-08 ENCOUNTER — PRIOR ORIGINAL RECORDS (OUTPATIENT)
Dept: OTHER | Age: 48
End: 2018-05-08

## 2018-05-08 LAB
ALBUMIN: 4.1 G/DL
ALKALINE PHOSPHATATE(ALK PHOS): 59 IU/L
BILIRUBIN TOTAL: 0.5 MG/DL
BUN: 15 MG/DL
CALCIUM: 9.4 MG/DL
CHLORIDE: 108 MEQ/L
CHOLESTEROL, TOTAL: 150 MG/DL
CREATININE, SERUM: 1.14 MG/DL
GLUCOSE: 90 MG/DL
HDL CHOLESTEROL: 61 MG/DL
LDL CHOLESTEROL: 68 MG/DL
NON-HDL CHOLESTEROL: 89 MG/DL
POTASSIUM, SERUM: 3.8 MEQ/L
PROTEIN, TOTAL: 7.9 G/DL
SGOT (AST): 18 IU/L
SGPT (ALT): 27 IU/L
SODIUM: 139 MEQ/L
TOTAL CHOLESTEROL / HDL RATIO: 2.46 RATIO UN
TRIGLYCERIDES: 106 MG/DL

## 2018-05-12 ENCOUNTER — HOSPITAL ENCOUNTER (OUTPATIENT)
Dept: CT IMAGING | Age: 48
Discharge: HOME | End: 2018-05-12
Attending: FAMILY MEDICINE
Payer: COMMERCIAL

## 2018-05-12 DIAGNOSIS — R91.1 PULMONARY NODULE: ICD-10-CM

## 2018-05-12 PROCEDURE — 71250 CT THORAX DX C-: CPT | Performed by: FAMILY MEDICINE

## 2018-05-23 ENCOUNTER — CHARTING TRANS (OUTPATIENT)
Dept: OTHER | Age: 48
End: 2018-05-23

## 2018-05-25 ENCOUNTER — CHARTING TRANS (OUTPATIENT)
Dept: OTHER | Age: 48
End: 2018-05-25

## 2018-07-03 RX ORDER — VALSARTAN 160 MG/1
160 TABLET ORAL DAILY
Qty: 90 TABLET | Refills: 0 | Status: SHIPPED | OUTPATIENT
Start: 2018-07-03 | End: 2018-11-02 | Stop reason: ALTCHOICE

## 2018-07-03 NOTE — TELEPHONE ENCOUNTER
Requesting Valsartan   LOV: 5/15/18  RTC: 3 months   Last Relevant Labs: pp  Filled: 7/14/17 #90 with 3 refills    Future Appointments  Date Time Provider Fred Hong   8/4/2018 7:45 AM Bhakti Cast MD [facility] [address] [facility] [address]       Refilled per carloz

## 2018-08-15 ENCOUNTER — TELEPHONE (OUTPATIENT)
Dept: FAMILY MEDICINE CLINIC | Facility: CLINIC | Age: 48
End: 2018-08-15

## 2018-08-28 ENCOUNTER — PRIOR ORIGINAL RECORDS (OUTPATIENT)
Dept: OTHER | Age: 48
End: 2018-08-28

## 2018-08-28 ENCOUNTER — MYAURORA ACCOUNT LINK (OUTPATIENT)
Dept: OTHER | Age: 48
End: 2018-08-28

## 2018-11-01 ENCOUNTER — MED REC SCAN ONLY (OUTPATIENT)
Dept: FAMILY MEDICINE CLINIC | Facility: CLINIC | Age: 48
End: 2018-11-01

## 2018-11-01 VITALS — HEART RATE: 88 BPM | RESPIRATION RATE: 16 BRPM | BODY MASS INDEX: 32.49 KG/M2 | HEIGHT: 69 IN | WEIGHT: 219.36 LBS

## 2018-11-02 PROCEDURE — 86200 CCP ANTIBODY: CPT | Performed by: INTERNAL MEDICINE

## 2018-11-02 PROCEDURE — 86803 HEPATITIS C AB TEST: CPT | Performed by: INTERNAL MEDICINE

## 2018-11-02 PROCEDURE — 86235 NUCLEAR ANTIGEN ANTIBODY: CPT | Performed by: INTERNAL MEDICINE

## 2018-11-02 PROCEDURE — 86704 HEP B CORE ANTIBODY TOTAL: CPT | Performed by: INTERNAL MEDICINE

## 2018-11-20 ENCOUNTER — TELEPHONE (OUTPATIENT)
Dept: FAMILY MEDICINE CLINIC | Facility: CLINIC | Age: 48
End: 2018-11-20

## 2018-11-20 DIAGNOSIS — Z23 NEED FOR INFLUENZA VACCINATION: ICD-10-CM

## 2018-11-20 DIAGNOSIS — Z23 NEED FOR PNEUMOCOCCAL VACCINATION: Primary | ICD-10-CM

## 2018-11-20 NOTE — TELEPHONE ENCOUNTER
Future Appointments   Date Time Provider Fred Hong   11/21/2018  9:00 AM [facility] 20 NURSE [facility] [address] [facility] [address]   1/24/2019 10:00 AM Eliceo Negro MD Monty Mings   4/11/2019 11:00 AM MD Jamari Clancy

## 2018-11-20 NOTE — TELEPHONE ENCOUNTER
There is no notation of needing the shots in her visit with rheumatology. She is 57years old. On MTX. I have pended an order for both shots if you feel appropriate. I pended Prevnar if that should be first with her age.

## 2018-11-21 ENCOUNTER — NURSE ONLY (OUTPATIENT)
Dept: FAMILY MEDICINE CLINIC | Facility: CLINIC | Age: 48
End: 2018-11-21
Payer: COMMERCIAL

## 2018-11-21 DIAGNOSIS — Z23 NEED FOR VACCINATION AGAINST STREPTOCOCCUS PNEUMONIAE: ICD-10-CM

## 2018-11-21 PROCEDURE — 90471 IMMUNIZATION ADMIN: CPT | Performed by: FAMILY MEDICINE

## 2018-11-21 PROCEDURE — 90670 PCV13 VACCINE IM: CPT | Performed by: FAMILY MEDICINE

## 2018-11-21 PROCEDURE — 90472 IMMUNIZATION ADMIN EACH ADD: CPT | Performed by: FAMILY MEDICINE

## 2018-11-21 PROCEDURE — 90686 IIV4 VACC NO PRSV 0.5 ML IM: CPT | Performed by: FAMILY MEDICINE

## 2018-11-21 NOTE — PROGRESS NOTES
Patient is here today for her flu vaccine and a Pneumonia vaccine. VIS sheets given to patient, all questions were answered. Injections given with no adverse reactions noted.

## 2019-01-24 PROCEDURE — 86235 NUCLEAR ANTIGEN ANTIBODY: CPT | Performed by: INTERNAL MEDICINE

## 2019-01-24 PROCEDURE — 83516 IMMUNOASSAY NONANTIBODY: CPT | Performed by: INTERNAL MEDICINE

## 2019-01-24 PROCEDURE — 86225 DNA ANTIBODY NATIVE: CPT | Performed by: INTERNAL MEDICINE

## 2019-01-24 PROCEDURE — 86038 ANTINUCLEAR ANTIBODIES: CPT | Performed by: INTERNAL MEDICINE

## 2019-02-26 ENCOUNTER — PRIOR ORIGINAL RECORDS (OUTPATIENT)
Dept: OTHER | Age: 49
End: 2019-02-26

## 2019-02-28 VITALS
BODY MASS INDEX: 32.13 KG/M2 | DIASTOLIC BLOOD PRESSURE: 84 MMHG | HEIGHT: 68 IN | WEIGHT: 212 LBS | SYSTOLIC BLOOD PRESSURE: 132 MMHG | HEART RATE: 88 BPM

## 2019-02-28 VITALS
HEIGHT: 69 IN | DIASTOLIC BLOOD PRESSURE: 92 MMHG | WEIGHT: 236 LBS | HEART RATE: 81 BPM | BODY MASS INDEX: 34.96 KG/M2 | SYSTOLIC BLOOD PRESSURE: 140 MMHG

## 2019-02-28 VITALS — HEIGHT: 68 IN | BODY MASS INDEX: 31.83 KG/M2 | WEIGHT: 210 LBS

## 2019-03-01 VITALS
DIASTOLIC BLOOD PRESSURE: 77 MMHG | BODY MASS INDEX: 30.31 KG/M2 | SYSTOLIC BLOOD PRESSURE: 119 MMHG | HEART RATE: 66 BPM | HEIGHT: 68 IN | WEIGHT: 200 LBS

## 2019-03-07 VITALS
HEIGHT: 69 IN | DIASTOLIC BLOOD PRESSURE: 78 MMHG | WEIGHT: 250 LBS | SYSTOLIC BLOOD PRESSURE: 128 MMHG | HEART RATE: 74 BPM | BODY MASS INDEX: 37.03 KG/M2

## 2019-03-13 RX ORDER — OLMESARTAN MEDOXOMIL 20 MG/1
TABLET ORAL
Qty: 90 TABLET | Refills: 1 | Status: SHIPPED | OUTPATIENT
Start: 2019-03-13 | End: 2020-03-03 | Stop reason: SDUPTHER

## 2019-03-22 ENCOUNTER — TELEPHONE (OUTPATIENT)
Dept: CARDIOLOGY | Age: 49
End: 2019-03-22

## 2019-03-22 DIAGNOSIS — I10 BENIGN HYPERTENSION: Primary | ICD-10-CM

## 2019-03-22 RX ORDER — LISINOPRIL 10 MG/1
10 TABLET ORAL DAILY
Qty: 30 TABLET | Refills: 6 | Status: SHIPPED | OUTPATIENT
Start: 2019-03-22 | End: 2019-09-03 | Stop reason: SDUPTHER

## 2019-04-12 RX ORDER — ATORVASTATIN CALCIUM 20 MG/1
TABLET, FILM COATED ORAL
COMMUNITY
Start: 2018-08-28 | End: 2019-07-14 | Stop reason: SDUPTHER

## 2019-04-12 RX ORDER — MULTIVITAMIN
CAPSULE ORAL
COMMUNITY

## 2019-04-12 RX ORDER — IBUPROFEN 200 MG
TABLET ORAL
COMMUNITY

## 2019-04-12 RX ORDER — METHOTREXATE 2.5 MG/1
TABLET ORAL
COMMUNITY
End: 2020-12-08

## 2019-04-12 RX ORDER — FOLIC ACID 1 MG/1
TABLET ORAL
COMMUNITY
End: 2020-12-08

## 2019-07-16 ENCOUNTER — TELEPHONE (OUTPATIENT)
Dept: CARDIOLOGY | Age: 49
End: 2019-07-16

## 2019-07-16 DIAGNOSIS — E78.5 HYPERLIPIDEMIA, UNSPECIFIED HYPERLIPIDEMIA TYPE: Primary | ICD-10-CM

## 2019-07-16 RX ORDER — ATORVASTATIN CALCIUM 20 MG/1
TABLET, FILM COATED ORAL
Qty: 30 TABLET | Refills: 2 | Status: CANCELLED | OUTPATIENT
Start: 2019-07-16

## 2019-07-16 RX ORDER — ATORVASTATIN CALCIUM 20 MG/1
TABLET, FILM COATED ORAL
Qty: 30 TABLET | Refills: 2 | Status: SHIPPED | OUTPATIENT
Start: 2019-07-16 | End: 2019-10-25 | Stop reason: SDUPTHER

## 2019-09-03 ENCOUNTER — OFFICE VISIT (OUTPATIENT)
Dept: CARDIOLOGY | Age: 49
End: 2019-09-03

## 2019-09-03 VITALS
DIASTOLIC BLOOD PRESSURE: 94 MMHG | BODY MASS INDEX: 34.51 KG/M2 | WEIGHT: 233 LBS | HEART RATE: 105 BPM | HEIGHT: 69 IN | SYSTOLIC BLOOD PRESSURE: 148 MMHG

## 2019-09-03 DIAGNOSIS — I10 HYPERTENSION, BENIGN: Primary | ICD-10-CM

## 2019-09-03 DIAGNOSIS — E78.01 ESSENTIAL FAMILIAL HYPERCHOLESTEROLEMIA: ICD-10-CM

## 2019-09-03 DIAGNOSIS — Z82.49 FAMILY HISTORY OF CORONARY ARTERIOSCLEROSIS: ICD-10-CM

## 2019-09-03 DIAGNOSIS — E78.5 HYPERLIPIDEMIA, UNSPECIFIED HYPERLIPIDEMIA TYPE: ICD-10-CM

## 2019-09-03 DIAGNOSIS — R73.03 PREDIABETES: ICD-10-CM

## 2019-09-03 DIAGNOSIS — I10 BENIGN HYPERTENSION: ICD-10-CM

## 2019-09-03 DIAGNOSIS — E55.9 VITAMIN D DEFICIENCY: ICD-10-CM

## 2019-09-03 DIAGNOSIS — E78.1 HYPERTRIGLYCERIDEMIA: ICD-10-CM

## 2019-09-03 DIAGNOSIS — I25.10 CORONARY ARTERY DISEASE INVOLVING NATIVE CORONARY ARTERY OF NATIVE HEART WITHOUT ANGINA PECTORIS: ICD-10-CM

## 2019-09-03 PROCEDURE — 93000 ELECTROCARDIOGRAM COMPLETE: CPT | Performed by: INTERNAL MEDICINE

## 2019-09-03 PROCEDURE — 3080F DIAST BP >= 90 MM HG: CPT | Performed by: INTERNAL MEDICINE

## 2019-09-03 PROCEDURE — 3077F SYST BP >= 140 MM HG: CPT | Performed by: INTERNAL MEDICINE

## 2019-09-03 PROCEDURE — 99214 OFFICE O/P EST MOD 30 MIN: CPT | Performed by: INTERNAL MEDICINE

## 2019-09-03 RX ORDER — LISINOPRIL 20 MG/1
20 TABLET ORAL DAILY
Qty: 30 TABLET | Refills: 3 | Status: SHIPPED | OUTPATIENT
Start: 2019-09-03 | End: 2019-12-30 | Stop reason: SDUPTHER

## 2019-09-03 SDOH — HEALTH STABILITY: PHYSICAL HEALTH: ON AVERAGE, HOW MANY MINUTES DO YOU ENGAGE IN EXERCISE AT THIS LEVEL?: 60 MIN

## 2019-09-03 SDOH — HEALTH STABILITY: PHYSICAL HEALTH: ON AVERAGE, HOW MANY DAYS PER WEEK DO YOU ENGAGE IN MODERATE TO STRENUOUS EXERCISE (LIKE A BRISK WALK)?: 7 DAYS

## 2019-10-15 ENCOUNTER — LAB ENCOUNTER (OUTPATIENT)
Dept: LAB | Age: 49
End: 2019-10-15
Attending: INTERNAL MEDICINE
Payer: COMMERCIAL

## 2019-10-15 DIAGNOSIS — Z79.899 ENCOUNTER FOR LONG-TERM (CURRENT) USE OF HIGH-RISK MEDICATION: ICD-10-CM

## 2019-10-15 DIAGNOSIS — M06.00 SERONEGATIVE RHEUMATOID ARTHRITIS (HCC): ICD-10-CM

## 2019-10-15 DIAGNOSIS — E78.5 HYPERLIPIDEMIA: ICD-10-CM

## 2019-10-15 DIAGNOSIS — M24.80 JOINT CREPITUS: ICD-10-CM

## 2019-10-15 LAB
CHOLEST SERPL-MCNC: 166 MG/DL
CHOLEST/HDLC SERPL: NORMAL {RATIO}
HDLC SERPL-MCNC: 52 MG/DL
LDLC SERPL CALC-MCNC: 85 MG/DL
LENGTH OF FAST TIME PATIENT: NORMAL H
NONHDLC SERPL-MCNC: 114 MG/DL
TRIGL SERPL-MCNC: 147 MG/DL
VLDLC SERPL CALC-MCNC: NORMAL MG/DL

## 2019-10-15 PROCEDURE — 80061 LIPID PANEL: CPT

## 2019-10-15 PROCEDURE — 82565 ASSAY OF CREATININE: CPT

## 2019-10-15 PROCEDURE — 85025 COMPLETE CBC W/AUTO DIFF WBC: CPT

## 2019-10-15 PROCEDURE — 85652 RBC SED RATE AUTOMATED: CPT

## 2019-10-15 PROCEDURE — 86140 C-REACTIVE PROTEIN: CPT

## 2019-10-15 PROCEDURE — 36415 COLL VENOUS BLD VENIPUNCTURE: CPT

## 2019-10-15 PROCEDURE — 84520 ASSAY OF UREA NITROGEN: CPT

## 2019-10-15 PROCEDURE — 80076 HEPATIC FUNCTION PANEL: CPT

## 2019-10-16 NOTE — PROGRESS NOTES
Mycjoet message sent to patient. Shirlene,  Your methotrexate labs are stable. Please repeat labs every 2 months. Continue current treatment plan. Follow up as planned.   Dr. Dee

## 2019-10-22 ENCOUNTER — TELEPHONE (OUTPATIENT)
Dept: CARDIOLOGY | Age: 49
End: 2019-10-22

## 2019-10-24 PROBLEM — M06.00 SERONEGATIVE RHEUMATOID ARTHRITIS (HCC): Status: ACTIVE | Noted: 2019-10-24

## 2019-10-25 RX ORDER — ATORVASTATIN CALCIUM 20 MG/1
TABLET, FILM COATED ORAL
Qty: 30 TABLET | Refills: 5 | Status: SHIPPED | OUTPATIENT
Start: 2019-10-25 | End: 2020-03-03 | Stop reason: SDUPTHER

## 2019-11-07 ENCOUNTER — CLINICAL ABSTRACT (OUTPATIENT)
Dept: CARDIOLOGY | Age: 49
End: 2019-11-07

## 2019-12-30 DIAGNOSIS — I10 BENIGN HYPERTENSION: ICD-10-CM

## 2019-12-30 RX ORDER — LISINOPRIL 20 MG/1
TABLET ORAL
Qty: 30 TABLET | Refills: 11 | Status: SHIPPED | OUTPATIENT
Start: 2019-12-30 | End: 2020-03-03 | Stop reason: SDUPTHER

## 2020-02-05 ENCOUNTER — LAB ENCOUNTER (OUTPATIENT)
Dept: LAB | Age: 50
End: 2020-02-05
Attending: INTERNAL MEDICINE
Payer: COMMERCIAL

## 2020-02-05 DIAGNOSIS — Z79.899 ENCOUNTER FOR LONG-TERM (CURRENT) USE OF HIGH-RISK MEDICATION: ICD-10-CM

## 2020-02-05 DIAGNOSIS — M06.00 SERONEGATIVE RHEUMATOID ARTHRITIS (HCC): ICD-10-CM

## 2020-02-05 LAB
ALBUMIN SERPL-MCNC: 3.6 G/DL (ref 3.4–5)
ALP LIVER SERPL-CCNC: 73 U/L (ref 39–100)
ALT SERPL-CCNC: 23 U/L (ref 13–56)
AST SERPL-CCNC: 14 U/L (ref 15–37)
BASOPHILS # BLD AUTO: 0.1 X10(3) UL (ref 0–0.2)
BASOPHILS NFR BLD AUTO: 1.3 %
BILIRUB DIRECT SERPL-MCNC: <0.1 MG/DL (ref 0–0.2)
BILIRUB SERPL-MCNC: 0.3 MG/DL (ref 0.1–2)
BUN BLD-MCNC: 17 MG/DL (ref 7–18)
CREAT BLD-MCNC: 1 MG/DL (ref 0.55–1.02)
CRP SERPL-MCNC: 0.34 MG/DL (ref ?–0.3)
DEPRECATED RDW RBC AUTO: 44.8 FL (ref 35.1–46.3)
EOSINOPHIL # BLD AUTO: 0.1 X10(3) UL (ref 0–0.7)
EOSINOPHIL NFR BLD AUTO: 1.3 %
ERYTHROCYTE [DISTWIDTH] IN BLOOD BY AUTOMATED COUNT: 13.6 % (ref 11–15)
HCT VFR BLD AUTO: 43.2 % (ref 35–48)
HGB BLD-MCNC: 14 G/DL (ref 12–16)
IMM GRANULOCYTES # BLD AUTO: 0.05 X10(3) UL (ref 0–1)
IMM GRANULOCYTES NFR BLD: 0.7 %
LYMPHOCYTES # BLD AUTO: 1.6 X10(3) UL (ref 1–4)
LYMPHOCYTES NFR BLD AUTO: 21 %
M PROTEIN MFR SERPL ELPH: 7.6 G/DL (ref 6.4–8.2)
MCH RBC QN AUTO: 29.5 PG (ref 26–34)
MCHC RBC AUTO-ENTMCNC: 32.4 G/DL (ref 31–37)
MCV RBC AUTO: 90.9 FL (ref 80–100)
MONOCYTES # BLD AUTO: 0.37 X10(3) UL (ref 0.1–1)
MONOCYTES NFR BLD AUTO: 4.8 %
NEUTROPHILS # BLD AUTO: 5.41 X10 (3) UL (ref 1.5–7.7)
NEUTROPHILS # BLD AUTO: 5.41 X10(3) UL (ref 1.5–7.7)
NEUTROPHILS NFR BLD AUTO: 70.9 %
PLATELET # BLD AUTO: 340 10(3)UL (ref 150–450)
RBC # BLD AUTO: 4.75 X10(6)UL (ref 3.8–5.3)
SED RATE-ML: 10 MM/HR (ref 0–25)
WBC # BLD AUTO: 7.6 X10(3) UL (ref 4–11)

## 2020-02-05 PROCEDURE — 36415 COLL VENOUS BLD VENIPUNCTURE: CPT

## 2020-02-05 PROCEDURE — 86140 C-REACTIVE PROTEIN: CPT

## 2020-02-05 PROCEDURE — 84520 ASSAY OF UREA NITROGEN: CPT

## 2020-02-05 PROCEDURE — 82565 ASSAY OF CREATININE: CPT

## 2020-02-05 PROCEDURE — 85025 COMPLETE CBC W/AUTO DIFF WBC: CPT

## 2020-02-05 PROCEDURE — 80076 HEPATIC FUNCTION PANEL: CPT

## 2020-02-05 PROCEDURE — 85652 RBC SED RATE AUTOMATED: CPT

## 2020-02-05 NOTE — PROGRESS NOTES
I Had Cancer message sent to patient. Shirlene,  Your methotrexate labs are stable. Please repeat labs every 3 months. Continue current treatment plan. Follow up as planned.   Dr. Maximo Brewer

## 2020-03-03 ENCOUNTER — OFFICE VISIT (OUTPATIENT)
Dept: CARDIOLOGY | Age: 50
End: 2020-03-03

## 2020-03-03 VITALS
HEIGHT: 69 IN | SYSTOLIC BLOOD PRESSURE: 132 MMHG | HEART RATE: 96 BPM | BODY MASS INDEX: 36.43 KG/M2 | DIASTOLIC BLOOD PRESSURE: 83 MMHG | WEIGHT: 246 LBS

## 2020-03-03 DIAGNOSIS — I10 HYPERTENSION, BENIGN: ICD-10-CM

## 2020-03-03 DIAGNOSIS — R73.03 PREDIABETES: ICD-10-CM

## 2020-03-03 DIAGNOSIS — I25.10 CORONARY ARTERY DISEASE INVOLVING NATIVE CORONARY ARTERY OF NATIVE HEART WITHOUT ANGINA PECTORIS: Primary | ICD-10-CM

## 2020-03-03 DIAGNOSIS — E78.01 ESSENTIAL FAMILIAL HYPERCHOLESTEROLEMIA: ICD-10-CM

## 2020-03-03 DIAGNOSIS — E78.1 HYPERTRIGLYCERIDEMIA: ICD-10-CM

## 2020-03-03 DIAGNOSIS — I10 BENIGN HYPERTENSION: ICD-10-CM

## 2020-03-03 PROCEDURE — 99214 OFFICE O/P EST MOD 30 MIN: CPT | Performed by: INTERNAL MEDICINE

## 2020-03-03 PROCEDURE — 3075F SYST BP GE 130 - 139MM HG: CPT | Performed by: INTERNAL MEDICINE

## 2020-03-03 PROCEDURE — 3079F DIAST BP 80-89 MM HG: CPT | Performed by: INTERNAL MEDICINE

## 2020-03-03 RX ORDER — OLMESARTAN MEDOXOMIL 20 MG/1
20 TABLET ORAL DAILY
Qty: 90 TABLET | Refills: 3 | Status: SHIPPED | OUTPATIENT
Start: 2020-03-03 | End: 2021-02-18

## 2020-03-03 RX ORDER — LISINOPRIL 20 MG/1
20 TABLET ORAL DAILY
Qty: 90 TABLET | Refills: 3 | Status: SHIPPED | OUTPATIENT
Start: 2020-03-03 | End: 2020-10-29

## 2020-03-03 RX ORDER — ATORVASTATIN CALCIUM 20 MG/1
TABLET, FILM COATED ORAL
Qty: 90 TABLET | Refills: 3 | Status: SHIPPED | OUTPATIENT
Start: 2020-03-03 | End: 2020-12-08 | Stop reason: SDUPTHER

## 2020-03-03 SDOH — HEALTH STABILITY: PHYSICAL HEALTH: ON AVERAGE, HOW MANY MINUTES DO YOU ENGAGE IN EXERCISE AT THIS LEVEL?: 20 MIN

## 2020-03-03 SDOH — HEALTH STABILITY: PHYSICAL HEALTH: ON AVERAGE, HOW MANY DAYS PER WEEK DO YOU ENGAGE IN MODERATE TO STRENUOUS EXERCISE (LIKE A BRISK WALK)?: 7 DAYS

## 2020-03-03 ASSESSMENT — PATIENT HEALTH QUESTIONNAIRE - PHQ9
SUM OF ALL RESPONSES TO PHQ9 QUESTIONS 1 AND 2: 0
2. FEELING DOWN, DEPRESSED OR HOPELESS: NOT AT ALL
SUM OF ALL RESPONSES TO PHQ9 QUESTIONS 1 AND 2: 0
1. LITTLE INTEREST OR PLEASURE IN DOING THINGS: NOT AT ALL

## 2020-05-28 ENCOUNTER — LAB ENCOUNTER (OUTPATIENT)
Dept: LAB | Age: 50
End: 2020-05-28
Attending: INTERNAL MEDICINE
Payer: COMMERCIAL

## 2020-05-28 DIAGNOSIS — Z79.899 ENCOUNTER FOR LONG-TERM (CURRENT) USE OF HIGH-RISK MEDICATION: ICD-10-CM

## 2020-05-28 DIAGNOSIS — E78.5 HYPERLIPIDEMIA: Primary | ICD-10-CM

## 2020-05-28 DIAGNOSIS — M06.00 SERONEGATIVE RHEUMATOID ARTHRITIS (HCC): ICD-10-CM

## 2020-05-28 PROCEDURE — 85025 COMPLETE CBC W/AUTO DIFF WBC: CPT

## 2020-05-28 PROCEDURE — 80076 HEPATIC FUNCTION PANEL: CPT

## 2020-05-28 PROCEDURE — 82565 ASSAY OF CREATININE: CPT

## 2020-05-28 PROCEDURE — 80061 LIPID PANEL: CPT

## 2020-05-28 PROCEDURE — 36415 COLL VENOUS BLD VENIPUNCTURE: CPT

## 2020-05-28 PROCEDURE — 84520 ASSAY OF UREA NITROGEN: CPT

## 2020-05-28 PROCEDURE — 86140 C-REACTIVE PROTEIN: CPT

## 2020-05-28 PROCEDURE — 85652 RBC SED RATE AUTOMATED: CPT

## 2020-05-29 ENCOUNTER — TELEPHONE (OUTPATIENT)
Dept: CARDIOLOGY | Age: 50
End: 2020-05-29

## 2020-05-29 NOTE — PROGRESS NOTES
Evolero message sent to patient. Dawm,  Your methotrexate labs are stable. Please repeat labs every 3 months. Continue current treatment plan. Follow up as planned.   Dr. Halima Dolan

## 2020-09-22 ENCOUNTER — APPOINTMENT (OUTPATIENT)
Dept: CARDIOLOGY | Age: 50
End: 2020-09-22

## 2020-09-26 ENCOUNTER — LAB ENCOUNTER (OUTPATIENT)
Dept: LAB | Age: 50
End: 2020-09-26
Attending: FAMILY MEDICINE
Payer: COMMERCIAL

## 2020-09-26 DIAGNOSIS — M06.00 SERONEGATIVE RHEUMATOID ARTHRITIS (HCC): ICD-10-CM

## 2020-09-26 DIAGNOSIS — Z79.899 ENCOUNTER FOR LONG-TERM (CURRENT) USE OF HIGH-RISK MEDICATION: ICD-10-CM

## 2020-09-26 LAB
ALBUMIN SERPL-MCNC: 3.7 G/DL (ref 3.4–5)
ALP LIVER SERPL-CCNC: 71 U/L
ALT SERPL-CCNC: 56 U/L
AST SERPL-CCNC: 26 U/L (ref 15–37)
BASOPHILS # BLD AUTO: 0.09 X10(3) UL (ref 0–0.2)
BASOPHILS NFR BLD AUTO: 1.3 %
BILIRUB DIRECT SERPL-MCNC: <0.1 MG/DL (ref 0–0.2)
BILIRUB SERPL-MCNC: 0.4 MG/DL (ref 0.1–2)
BUN BLD-MCNC: 14 MG/DL (ref 7–18)
CREAT BLD-MCNC: 1.02 MG/DL
CRP SERPL-MCNC: <0.29 MG/DL (ref ?–0.3)
DEPRECATED RDW RBC AUTO: 43.6 FL (ref 35.1–46.3)
EOSINOPHIL # BLD AUTO: 0.15 X10(3) UL (ref 0–0.7)
EOSINOPHIL NFR BLD AUTO: 2.2 %
ERYTHROCYTE [DISTWIDTH] IN BLOOD BY AUTOMATED COUNT: 13.2 % (ref 11–15)
HCT VFR BLD AUTO: 46 %
HGB BLD-MCNC: 14.8 G/DL
IMM GRANULOCYTES # BLD AUTO: 0.02 X10(3) UL (ref 0–1)
IMM GRANULOCYTES NFR BLD: 0.3 %
LYMPHOCYTES # BLD AUTO: 1.66 X10(3) UL (ref 1–4)
LYMPHOCYTES NFR BLD AUTO: 24 %
M PROTEIN MFR SERPL ELPH: 7.5 G/DL (ref 6.4–8.2)
MCH RBC QN AUTO: 29.3 PG (ref 26–34)
MCHC RBC AUTO-ENTMCNC: 32.2 G/DL (ref 31–37)
MCV RBC AUTO: 91.1 FL
MONOCYTES # BLD AUTO: 0.65 X10(3) UL (ref 0.1–1)
MONOCYTES NFR BLD AUTO: 9.4 %
NEUTROPHILS # BLD AUTO: 4.36 X10 (3) UL (ref 1.5–7.7)
NEUTROPHILS # BLD AUTO: 4.36 X10(3) UL (ref 1.5–7.7)
NEUTROPHILS NFR BLD AUTO: 62.8 %
PLATELET # BLD AUTO: 352 10(3)UL (ref 150–450)
RBC # BLD AUTO: 5.05 X10(6)UL
SED RATE-ML: 10 MM/HR
WBC # BLD AUTO: 6.9 X10(3) UL (ref 4–11)

## 2020-09-26 PROCEDURE — 86140 C-REACTIVE PROTEIN: CPT

## 2020-09-26 PROCEDURE — 84520 ASSAY OF UREA NITROGEN: CPT

## 2020-09-26 PROCEDURE — 80076 HEPATIC FUNCTION PANEL: CPT

## 2020-09-26 PROCEDURE — 82565 ASSAY OF CREATININE: CPT

## 2020-09-26 PROCEDURE — 85652 RBC SED RATE AUTOMATED: CPT

## 2020-09-26 PROCEDURE — 36415 COLL VENOUS BLD VENIPUNCTURE: CPT

## 2020-09-26 PROCEDURE — 85025 COMPLETE CBC W/AUTO DIFF WBC: CPT

## 2020-09-27 NOTE — PROGRESS NOTES
OffScale message sent to patient. Shirlene  Your Lis Gambles labs are stable. Repeat labs every 2 months. Continue current treatment plan. Follow up as planned.   Dr. Markus Elias

## 2020-10-20 ENCOUNTER — APPOINTMENT (OUTPATIENT)
Dept: CARDIOLOGY | Age: 50
End: 2020-10-20

## 2020-10-29 DIAGNOSIS — I10 BENIGN HYPERTENSION: ICD-10-CM

## 2020-10-29 RX ORDER — LISINOPRIL 20 MG/1
TABLET ORAL
Qty: 90 TABLET | Refills: 0 | Status: SHIPPED | OUTPATIENT
Start: 2020-10-29 | End: 2020-12-08 | Stop reason: SDUPTHER

## 2020-11-14 ENCOUNTER — LAB ENCOUNTER (OUTPATIENT)
Dept: LAB | Age: 50
End: 2020-11-14
Attending: INTERNAL MEDICINE
Payer: COMMERCIAL

## 2020-11-14 DIAGNOSIS — Z79.899 ENCOUNTER FOR LONG-TERM (CURRENT) USE OF HIGH-RISK MEDICATION: ICD-10-CM

## 2020-11-14 DIAGNOSIS — M06.00 SERONEGATIVE RHEUMATOID ARTHRITIS (HCC): ICD-10-CM

## 2020-11-14 PROCEDURE — 82565 ASSAY OF CREATININE: CPT

## 2020-11-14 PROCEDURE — 85652 RBC SED RATE AUTOMATED: CPT

## 2020-11-14 PROCEDURE — 36415 COLL VENOUS BLD VENIPUNCTURE: CPT

## 2020-11-14 PROCEDURE — 80076 HEPATIC FUNCTION PANEL: CPT

## 2020-11-14 PROCEDURE — 85025 COMPLETE CBC W/AUTO DIFF WBC: CPT

## 2020-11-14 PROCEDURE — 84520 ASSAY OF UREA NITROGEN: CPT

## 2020-11-14 PROCEDURE — 86140 C-REACTIVE PROTEIN: CPT

## 2020-11-15 NOTE — PROGRESS NOTES
mygall message sent to patient. Monitoring labs are stable. Continue current treatment plan. Repeat labs every 2 months. Follow up as planned.

## 2020-12-07 RX ORDER — LEFLUNOMIDE 20 MG/1
20 TABLET ORAL DAILY
COMMUNITY
Start: 2020-09-30

## 2020-12-08 ENCOUNTER — V-VISIT (OUTPATIENT)
Dept: CARDIOLOGY | Age: 50
End: 2020-12-08

## 2020-12-08 VITALS — HEIGHT: 69 IN | BODY MASS INDEX: 35.55 KG/M2 | WEIGHT: 240 LBS

## 2020-12-08 DIAGNOSIS — Z82.49 FAMILY HISTORY OF CORONARY ARTERIOSCLEROSIS: ICD-10-CM

## 2020-12-08 DIAGNOSIS — E78.01 ESSENTIAL FAMILIAL HYPERCHOLESTEROLEMIA: ICD-10-CM

## 2020-12-08 DIAGNOSIS — I25.10 CORONARY ARTERY DISEASE INVOLVING NATIVE CORONARY ARTERY OF NATIVE HEART WITHOUT ANGINA PECTORIS: Primary | ICD-10-CM

## 2020-12-08 DIAGNOSIS — E78.1 HYPERTRIGLYCERIDEMIA: ICD-10-CM

## 2020-12-08 DIAGNOSIS — E55.9 VITAMIN D DEFICIENCY: ICD-10-CM

## 2020-12-08 DIAGNOSIS — E78.5 HYPERLIPIDEMIA, UNSPECIFIED HYPERLIPIDEMIA TYPE: ICD-10-CM

## 2020-12-08 DIAGNOSIS — R73.03 PREDIABETES: ICD-10-CM

## 2020-12-08 DIAGNOSIS — I10 BENIGN HYPERTENSION: ICD-10-CM

## 2020-12-08 DIAGNOSIS — I10 HYPERTENSION, BENIGN: ICD-10-CM

## 2020-12-08 PROCEDURE — 99214 OFFICE O/P EST MOD 30 MIN: CPT | Performed by: INTERNAL MEDICINE

## 2020-12-08 RX ORDER — ATORVASTATIN CALCIUM 20 MG/1
TABLET, FILM COATED ORAL
Qty: 90 TABLET | Refills: 3 | Status: SHIPPED | OUTPATIENT
Start: 2020-12-08

## 2020-12-08 RX ORDER — LISINOPRIL 20 MG/1
20 TABLET ORAL DAILY
Qty: 90 TABLET | Refills: 3 | Status: SHIPPED | OUTPATIENT
Start: 2020-12-08

## 2020-12-08 SDOH — HEALTH STABILITY: PHYSICAL HEALTH: ON AVERAGE, HOW MANY MINUTES DO YOU ENGAGE IN EXERCISE AT THIS LEVEL?: 20 MIN

## 2020-12-08 SDOH — HEALTH STABILITY: PHYSICAL HEALTH: ON AVERAGE, HOW MANY DAYS PER WEEK DO YOU ENGAGE IN MODERATE TO STRENUOUS EXERCISE (LIKE A BRISK WALK)?: 7 DAYS

## 2020-12-08 ASSESSMENT — PATIENT HEALTH QUESTIONNAIRE - PHQ9
1. LITTLE INTEREST OR PLEASURE IN DOING THINGS: NOT AT ALL
SUM OF ALL RESPONSES TO PHQ9 QUESTIONS 1 AND 2: 0
CLINICAL INTERPRETATION OF PHQ2 SCORE: NO FURTHER SCREENING NEEDED
SUM OF ALL RESPONSES TO PHQ9 QUESTIONS 1 AND 2: 0
2. FEELING DOWN, DEPRESSED OR HOPELESS: NOT AT ALL
CLINICAL INTERPRETATION OF PHQ9 SCORE: NO FURTHER SCREENING NEEDED

## 2020-12-08 ASSESSMENT — ENCOUNTER SYMPTOMS
ALLERGIC/IMMUNOLOGIC COMMENTS: NO NEW FOOD ALLERGIES
WEIGHT GAIN: 0
HEMATOCHEZIA: 0
HEMOPTYSIS: 0
WEIGHT LOSS: 0
FEVER: 0
COUGH: 0
CHILLS: 0
BRUISES/BLEEDS EASILY: 0
SUSPICIOUS LESIONS: 0

## 2021-01-09 ENCOUNTER — LAB ENCOUNTER (OUTPATIENT)
Dept: LAB | Age: 51
End: 2021-01-09
Attending: INTERNAL MEDICINE
Payer: COMMERCIAL

## 2021-01-09 DIAGNOSIS — D84.9 IMMUNOSUPPRESSED STATUS (HCC): ICD-10-CM

## 2021-01-09 DIAGNOSIS — E78.01 FAMILIAL HYPERCHOLESTEROLEMIA: Primary | ICD-10-CM

## 2021-01-09 DIAGNOSIS — Z79.899 ENCOUNTER FOR LONG-TERM (CURRENT) USE OF HIGH-RISK MEDICATION: ICD-10-CM

## 2021-01-09 DIAGNOSIS — I10 ESSENTIAL HYPERTENSION, MALIGNANT: ICD-10-CM

## 2021-01-09 DIAGNOSIS — M06.00 SERONEGATIVE RHEUMATOID ARTHRITIS (HCC): ICD-10-CM

## 2021-01-09 LAB
ALBUMIN SERPL-MCNC: 3.9 G/DL (ref 3.4–5)
ALBUMIN/GLOB SERPL: 1.2 {RATIO} (ref 1–2)
ALP LIVER SERPL-CCNC: 68 U/L
ALT SERPL-CCNC: 31 U/L
ANION GAP SERPL CALC-SCNC: 6 MMOL/L (ref 0–18)
AST SERPL-CCNC: 19 U/L (ref 15–37)
BASOPHILS # BLD AUTO: 0.1 X10(3) UL (ref 0–0.2)
BASOPHILS NFR BLD AUTO: 1.5 %
BILIRUB DIRECT SERPL-MCNC: 0.1 MG/DL (ref 0–0.2)
BILIRUB SERPL-MCNC: 0.5 MG/DL (ref 0.1–2)
BUN BLD-MCNC: 13 MG/DL (ref 7–18)
BUN/CREAT SERPL: 14.6 (ref 10–20)
CALCIUM BLD-MCNC: 10.2 MG/DL (ref 8.5–10.1)
CHLORIDE SERPL-SCNC: 104 MMOL/L (ref 98–112)
CHOLEST SMN-MCNC: 192 MG/DL (ref ?–200)
CO2 SERPL-SCNC: 27 MMOL/L (ref 21–32)
CREAT BLD-MCNC: 0.89 MG/DL
CRP SERPL-MCNC: <0.29 MG/DL (ref ?–0.3)
DEPRECATED RDW RBC AUTO: 40.9 FL (ref 35.1–46.3)
EOSINOPHIL # BLD AUTO: 0.28 X10(3) UL (ref 0–0.7)
EOSINOPHIL NFR BLD AUTO: 4.1 %
ERYTHROCYTE [DISTWIDTH] IN BLOOD BY AUTOMATED COUNT: 13.2 % (ref 11–15)
GLOBULIN PLAS-MCNC: 3.2 G/DL (ref 2.8–4.4)
GLUCOSE BLD-MCNC: 89 MG/DL (ref 70–99)
HCT VFR BLD AUTO: 44.2 %
HDLC SERPL-MCNC: 42 MG/DL (ref 40–59)
HGB BLD-MCNC: 14.7 G/DL
IMM GRANULOCYTES # BLD AUTO: 0.02 X10(3) UL (ref 0–1)
IMM GRANULOCYTES NFR BLD: 0.3 %
LDLC SERPL CALC-MCNC: 77 MG/DL (ref ?–100)
LYMPHOCYTES # BLD AUTO: 1.42 X10(3) UL (ref 1–4)
LYMPHOCYTES NFR BLD AUTO: 21 %
M PROTEIN MFR SERPL ELPH: 7.1 G/DL (ref 6.4–8.2)
MCH RBC QN AUTO: 28.5 PG (ref 26–34)
MCHC RBC AUTO-ENTMCNC: 33.3 G/DL (ref 31–37)
MCV RBC AUTO: 85.8 FL
MONOCYTES # BLD AUTO: 0.52 X10(3) UL (ref 0.1–1)
MONOCYTES NFR BLD AUTO: 7.7 %
NEUTROPHILS # BLD AUTO: 4.43 X10 (3) UL (ref 1.5–7.7)
NEUTROPHILS # BLD AUTO: 4.43 X10(3) UL (ref 1.5–7.7)
NEUTROPHILS NFR BLD AUTO: 65.4 %
NONHDLC SERPL-MCNC: 150 MG/DL (ref ?–130)
OSMOLALITY SERPL CALC.SUM OF ELEC: 284 MOSM/KG (ref 275–295)
PATIENT FASTING Y/N/NP: YES
PATIENT FASTING Y/N/NP: YES
PLATELET # BLD AUTO: 319 10(3)UL (ref 150–450)
POTASSIUM SERPL-SCNC: 4.2 MMOL/L (ref 3.5–5.1)
RBC # BLD AUTO: 5.15 X10(6)UL
SED RATE-ML: 8 MM/HR
SODIUM SERPL-SCNC: 137 MMOL/L (ref 136–145)
TRIGL SERPL-MCNC: 364 MG/DL (ref 30–149)
VLDLC SERPL CALC-MCNC: 73 MG/DL (ref 0–30)
WBC # BLD AUTO: 6.8 X10(3) UL (ref 4–11)

## 2021-01-09 PROCEDURE — 85652 RBC SED RATE AUTOMATED: CPT

## 2021-01-09 PROCEDURE — 82248 BILIRUBIN DIRECT: CPT

## 2021-01-09 PROCEDURE — 86140 C-REACTIVE PROTEIN: CPT

## 2021-01-09 PROCEDURE — 36415 COLL VENOUS BLD VENIPUNCTURE: CPT

## 2021-01-09 PROCEDURE — 80061 LIPID PANEL: CPT

## 2021-01-09 PROCEDURE — 85025 COMPLETE CBC W/AUTO DIFF WBC: CPT

## 2021-01-09 PROCEDURE — 80053 COMPREHEN METABOLIC PANEL: CPT

## 2021-01-10 NOTE — PROGRESS NOTES
InVisM message sent to patient. Your arava labs are stable. Repeat labs every 2 months. Continue current treatment plan. Follow up as planned.   Dr. Staci Powers

## 2021-01-13 ENCOUNTER — TELEPHONE (OUTPATIENT)
Dept: CARDIOLOGY | Age: 51
End: 2021-01-13

## 2021-01-13 DIAGNOSIS — E78.5 HYPERLIPIDEMIA, UNSPECIFIED HYPERLIPIDEMIA TYPE: ICD-10-CM

## 2021-01-13 DIAGNOSIS — Z82.49 FAMILY HISTORY OF CORONARY ARTERIOSCLEROSIS: ICD-10-CM

## 2021-01-13 DIAGNOSIS — E78.1 HYPERTRIGLYCERIDEMIA: ICD-10-CM

## 2021-01-13 DIAGNOSIS — R73.03 PREDIABETES: ICD-10-CM

## 2021-01-13 DIAGNOSIS — I25.10 CORONARY ARTERY DISEASE INVOLVING NATIVE CORONARY ARTERY OF NATIVE HEART WITHOUT ANGINA PECTORIS: Primary | ICD-10-CM

## 2021-01-18 ENCOUNTER — LAB ENCOUNTER (OUTPATIENT)
Dept: LAB | Age: 51
End: 2021-01-18
Attending: STUDENT IN AN ORGANIZED HEALTH CARE EDUCATION/TRAINING PROGRAM
Payer: COMMERCIAL

## 2021-01-18 DIAGNOSIS — R19.7 DIARRHEA, UNSPECIFIED TYPE: ICD-10-CM

## 2021-01-18 LAB
CRP SERPL-MCNC: <0.29 MG/DL (ref ?–0.3)
IGA SERPL-MCNC: 233 MG/DL (ref 70–312)

## 2021-01-18 PROCEDURE — 87427 SHIGA-LIKE TOXIN AG IA: CPT

## 2021-01-18 PROCEDURE — 82784 ASSAY IGA/IGD/IGG/IGM EACH: CPT

## 2021-01-18 PROCEDURE — 87272 CRYPTOSPORIDIUM AG IF: CPT

## 2021-01-18 PROCEDURE — 83993 ASSAY FOR CALPROTECTIN FECAL: CPT

## 2021-01-18 PROCEDURE — 87329 GIARDIA AG IA: CPT

## 2021-01-18 PROCEDURE — 86140 C-REACTIVE PROTEIN: CPT

## 2021-01-18 PROCEDURE — 87046 STOOL CULTR AEROBIC BACT EA: CPT

## 2021-01-18 PROCEDURE — 84999 UNLISTED CHEMISTRY PROCEDURE: CPT

## 2021-01-18 PROCEDURE — 82710 FATS/LIPIDS FECES QUANT: CPT

## 2021-01-18 PROCEDURE — 84302 ASSAY OF SWEAT SODIUM: CPT

## 2021-01-18 PROCEDURE — 82438 ASSAY OTHER FLUID CHLORIDES: CPT

## 2021-01-18 PROCEDURE — 87507 IADNA-DNA/RNA PROBE TQ 12-25: CPT

## 2021-01-18 PROCEDURE — 36415 COLL VENOUS BLD VENIPUNCTURE: CPT

## 2021-01-18 PROCEDURE — 83516 IMMUNOASSAY NONANTIBODY: CPT

## 2021-01-18 PROCEDURE — 82705 FATS/LIPIDS FECES QUAL: CPT

## 2021-01-18 PROCEDURE — 87209 SMEAR COMPLEX STAIN: CPT

## 2021-01-18 PROCEDURE — 87045 FECES CULTURE AEROBIC BACT: CPT

## 2021-01-18 PROCEDURE — 87177 OVA AND PARASITES SMEARS: CPT

## 2021-01-18 RX ORDER — ICOSAPENT ETHYL 1000 MG/1
2 CAPSULE ORAL 2 TIMES DAILY
Qty: 120 CAPSULE | Refills: 11 | Status: SHIPPED | OUTPATIENT
Start: 2021-01-18

## 2021-01-19 LAB
CRYPTOSP AG STL QL IA: NEGATIVE
G LAMBLIA AG STL QL IA: NEGATIVE
TTG IGA SER-ACNC: 0.2 U/ML (ref ?–7)

## 2021-01-21 LAB
CALPROTECTIN STL-MCNT: 37 ΜG/G (ref ?–50)
NEUTRAL FAT, FECAL: NORMAL
OVA AND PARASITE, FECAL INTERPRETATION: NEGATIVE
SPLIT FAT, FECAL: NORMAL

## 2021-01-26 NOTE — PROGRESS NOTES
Pt needs colonoscopy at [facility] please. Dx diarrhea. Golytely prep. ---    Chon Garber,    Your stool studies so far are normal. There is no evidence of celiac disease or fat malabsportion.    Your fecal calprotectin which can measure inflammation, is also nor

## 2021-02-18 RX ORDER — OLMESARTAN MEDOXOMIL 20 MG/1
TABLET ORAL
Qty: 30 TABLET | Refills: 11 | Status: SHIPPED | OUTPATIENT
Start: 2021-02-18

## 2021-02-26 ENCOUNTER — LAB ENCOUNTER (OUTPATIENT)
Dept: LAB | Age: 51
End: 2021-02-26
Attending: STUDENT IN AN ORGANIZED HEALTH CARE EDUCATION/TRAINING PROGRAM
Payer: COMMERCIAL

## 2021-02-26 DIAGNOSIS — Z79.899 ENCOUNTER FOR LONG-TERM (CURRENT) USE OF HIGH-RISK MEDICATION: ICD-10-CM

## 2021-02-26 DIAGNOSIS — M06.00 SERONEGATIVE RHEUMATOID ARTHRITIS (HCC): ICD-10-CM

## 2021-02-26 LAB
ALBUMIN SERPL-MCNC: 3.6 G/DL (ref 3.4–5)
ALP LIVER SERPL-CCNC: 68 U/L
ALT SERPL-CCNC: 41 U/L
AST SERPL-CCNC: 20 U/L (ref 15–37)
BASOPHILS # BLD AUTO: 0.1 X10(3) UL (ref 0–0.2)
BASOPHILS NFR BLD AUTO: 1.3 %
BILIRUB DIRECT SERPL-MCNC: 0.1 MG/DL (ref 0–0.2)
BILIRUB SERPL-MCNC: 0.3 MG/DL (ref 0.1–2)
BUN BLD-MCNC: 12 MG/DL (ref 7–18)
CREAT BLD-MCNC: 0.89 MG/DL
CRP SERPL-MCNC: <0.29 MG/DL (ref ?–0.3)
DEPRECATED RDW RBC AUTO: 43.8 FL (ref 35.1–46.3)
EOSINOPHIL # BLD AUTO: 0.6 X10(3) UL (ref 0–0.7)
EOSINOPHIL NFR BLD AUTO: 7.7 %
ERYTHROCYTE [DISTWIDTH] IN BLOOD BY AUTOMATED COUNT: 13.1 % (ref 11–15)
HCT VFR BLD AUTO: 44.2 %
HGB BLD-MCNC: 14 G/DL
IMM GRANULOCYTES # BLD AUTO: 0.02 X10(3) UL (ref 0–1)
IMM GRANULOCYTES NFR BLD: 0.3 %
LYMPHOCYTES # BLD AUTO: 1.7 X10(3) UL (ref 1–4)
LYMPHOCYTES NFR BLD AUTO: 21.7 %
M PROTEIN MFR SERPL ELPH: 7.5 G/DL (ref 6.4–8.2)
MCH RBC QN AUTO: 29.2 PG (ref 26–34)
MCHC RBC AUTO-ENTMCNC: 31.7 G/DL (ref 31–37)
MCV RBC AUTO: 92.1 FL
MONOCYTES # BLD AUTO: 0.75 X10(3) UL (ref 0.1–1)
MONOCYTES NFR BLD AUTO: 9.6 %
NEUTROPHILS # BLD AUTO: 4.66 X10 (3) UL (ref 1.5–7.7)
NEUTROPHILS # BLD AUTO: 4.66 X10(3) UL (ref 1.5–7.7)
NEUTROPHILS NFR BLD AUTO: 59.4 %
PLATELET # BLD AUTO: 360 10(3)UL (ref 150–450)
RBC # BLD AUTO: 4.8 X10(6)UL
SED RATE-ML: 8 MM/HR
WBC # BLD AUTO: 7.8 X10(3) UL (ref 4–11)

## 2021-02-26 PROCEDURE — 86140 C-REACTIVE PROTEIN: CPT

## 2021-02-26 PROCEDURE — 36415 COLL VENOUS BLD VENIPUNCTURE: CPT

## 2021-02-26 PROCEDURE — 85652 RBC SED RATE AUTOMATED: CPT

## 2021-02-26 PROCEDURE — 80076 HEPATIC FUNCTION PANEL: CPT

## 2021-02-26 PROCEDURE — 84520 ASSAY OF UREA NITROGEN: CPT

## 2021-02-26 PROCEDURE — 85025 COMPLETE CBC W/AUTO DIFF WBC: CPT

## 2021-02-26 PROCEDURE — 82565 ASSAY OF CREATININE: CPT

## 2021-02-26 NOTE — PROGRESS NOTES
Kionix message sent to patient. Monitoring labs are stable. Continue current treatment plan. Repeat labs every 3 months. Follow up as planned.

## 2021-03-26 ENCOUNTER — LAB ENCOUNTER (OUTPATIENT)
Dept: LAB | Age: 51
End: 2021-03-26
Attending: STUDENT IN AN ORGANIZED HEALTH CARE EDUCATION/TRAINING PROGRAM
Payer: COMMERCIAL

## 2021-03-26 DIAGNOSIS — Z01.818 PRE-OP TESTING: ICD-10-CM

## 2021-03-27 LAB — SARS-COV-2 RNA RESP QL NAA+PROBE: NOT DETECTED

## 2021-03-29 PROBLEM — R19.7 DIARRHEA: Status: ACTIVE | Noted: 2021-03-29

## 2021-03-30 ENCOUNTER — LAB ENCOUNTER (OUTPATIENT)
Dept: LAB | Age: 51
End: 2021-03-30
Attending: INTERNAL MEDICINE
Payer: COMMERCIAL

## 2021-03-30 DIAGNOSIS — Z01.818 PRE-OP TESTING: ICD-10-CM

## 2021-04-24 ENCOUNTER — LAB ENCOUNTER (OUTPATIENT)
Dept: LAB | Age: 51
End: 2021-04-24
Attending: INTERNAL MEDICINE
Payer: COMMERCIAL

## 2021-04-24 ENCOUNTER — HOSPITAL ENCOUNTER (OUTPATIENT)
Dept: GENERAL RADIOLOGY | Age: 51
Discharge: HOME | End: 2021-04-24
Attending: INTERNAL MEDICINE
Payer: COMMERCIAL

## 2021-04-24 DIAGNOSIS — M06.00 SERONEGATIVE RHEUMATOID ARTHRITIS (HCC): ICD-10-CM

## 2021-04-24 DIAGNOSIS — Z79.899 ENCOUNTER FOR LONG-TERM (CURRENT) USE OF HIGH-RISK MEDICATION: ICD-10-CM

## 2021-04-24 PROCEDURE — 71046 X-RAY EXAM CHEST 2 VIEWS: CPT | Performed by: INTERNAL MEDICINE

## 2021-04-24 PROCEDURE — 36415 COLL VENOUS BLD VENIPUNCTURE: CPT

## 2021-04-24 PROCEDURE — 86480 TB TEST CELL IMMUN MEASURE: CPT

## 2021-05-06 ENCOUNTER — OFFICE VISIT (OUTPATIENT)
Dept: URBAN - METROPOLITAN AREA CLINIC 44 | Facility: CLINIC | Age: 51
End: 2021-05-06

## 2021-05-06 ENCOUNTER — OFFICE VISIT (OUTPATIENT)
Dept: URBAN - METROPOLITAN AREA CLINIC 80 | Facility: CLINIC | Age: 51
End: 2021-05-06

## 2021-05-28 ENCOUNTER — OFFICE VISIT (OUTPATIENT)
Dept: URBAN - METROPOLITAN AREA SURGERY CENTER 27 | Facility: SURGERY CENTER | Age: 51
End: 2021-05-28

## 2021-06-09 ENCOUNTER — LAB ENCOUNTER (OUTPATIENT)
Dept: LAB | Age: 51
End: 2021-06-09
Attending: INTERNAL MEDICINE
Payer: COMMERCIAL

## 2021-06-09 DIAGNOSIS — Z79.899 ENCOUNTER FOR LONG-TERM (CURRENT) USE OF HIGH-RISK MEDICATION: ICD-10-CM

## 2021-06-09 DIAGNOSIS — M06.00 SERONEGATIVE RHEUMATOID ARTHRITIS (HCC): ICD-10-CM

## 2021-06-09 PROCEDURE — 84520 ASSAY OF UREA NITROGEN: CPT

## 2021-06-09 PROCEDURE — 82565 ASSAY OF CREATININE: CPT

## 2021-06-09 PROCEDURE — 86140 C-REACTIVE PROTEIN: CPT

## 2021-06-09 PROCEDURE — 80076 HEPATIC FUNCTION PANEL: CPT

## 2021-06-09 PROCEDURE — 85025 COMPLETE CBC W/AUTO DIFF WBC: CPT

## 2021-06-09 PROCEDURE — 36415 COLL VENOUS BLD VENIPUNCTURE: CPT

## 2021-06-09 PROCEDURE — 85652 RBC SED RATE AUTOMATED: CPT

## 2021-06-10 NOTE — PROGRESS NOTES
10sec message sent to patient. Monitoring labs are stable. Continue current treatment plan. Repeat labs every 3 months. Follow up as planned.

## 2021-06-21 ENCOUNTER — APPOINTMENT (OUTPATIENT)
Dept: CARDIOLOGY | Age: 51
End: 2021-06-21

## 2021-06-25 ENCOUNTER — OFFICE VISIT (OUTPATIENT)
Dept: URBAN - METROPOLITAN AREA SURGERY CENTER 28 | Facility: SURGERY CENTER | Age: 51
End: 2021-06-25

## 2021-06-25 ENCOUNTER — TELEPHONE (OUTPATIENT)
Dept: CARDIOLOGY | Age: 51
End: 2021-06-25

## 2021-06-25 PROBLEM — 80313002 PALPITATIONS: Status: ACTIVE | Noted: 2021-06-25

## 2021-06-28 ENCOUNTER — APPOINTMENT (OUTPATIENT)
Dept: CARDIOLOGY | Age: 51
End: 2021-06-28

## 2021-07-09 ENCOUNTER — OFFICE VISIT (OUTPATIENT)
Dept: URBAN - METROPOLITAN AREA CLINIC 13 | Facility: CLINIC | Age: 51
End: 2021-07-09

## 2021-08-28 ENCOUNTER — TELEPHONE ENCOUNTER (OUTPATIENT)
Dept: URBAN - METROPOLITAN AREA CLINIC 13 | Facility: CLINIC | Age: 51
End: 2021-08-28

## 2021-08-29 ENCOUNTER — TELEPHONE ENCOUNTER (OUTPATIENT)
Dept: URBAN - METROPOLITAN AREA CLINIC 13 | Facility: CLINIC | Age: 51
End: 2021-08-29

## 2021-08-29 RX ORDER — DICYCLOMINE HYDROCHLORIDE 10 MG/1
CAPSULE ORAL
Status: ACTIVE | COMMUNITY

## 2021-08-29 RX ORDER — RIFAXIMIN 550 MG/1
TABLET ORAL
Status: ACTIVE | COMMUNITY
Start: 2021-06-25

## 2021-08-31 ENCOUNTER — TELEPHONE ENCOUNTER (OUTPATIENT)
Dept: URBAN - METROPOLITAN AREA CLINIC 46 | Facility: CLINIC | Age: 51
End: 2021-08-31

## 2021-08-31 RX ORDER — LUBIPROSTONE 8 UG/1
1 CAPSULE WITH FOOD AND WATER CAPSULE, GELATIN COATED ORAL TWICE A DAY
Qty: 60 | Refills: 3 | OUTPATIENT
Start: 2021-09-01 | End: 2021-12-29

## 2021-08-31 RX ORDER — RIFAXIMIN 550 MG/1
TABLET ORAL
Status: ACTIVE | COMMUNITY
Start: 2021-06-25

## 2021-08-31 RX ORDER — DICYCLOMINE HYDROCHLORIDE 10 MG/1
CAPSULE ORAL
Status: ACTIVE | COMMUNITY

## 2021-09-01 PROBLEM — 35298007: Status: ACTIVE | Noted: 2021-09-01

## 2021-09-11 ENCOUNTER — LAB ENCOUNTER (OUTPATIENT)
Dept: LAB | Age: 51
End: 2021-09-11
Attending: INTERNAL MEDICINE
Payer: COMMERCIAL

## 2021-09-11 DIAGNOSIS — Z79.899 ENCOUNTER FOR LONG-TERM (CURRENT) USE OF HIGH-RISK MEDICATION: ICD-10-CM

## 2021-09-11 DIAGNOSIS — M06.00 SERONEGATIVE RHEUMATOID ARTHRITIS (HCC): ICD-10-CM

## 2021-09-11 LAB
ALBUMIN SERPL-MCNC: 3.5 G/DL (ref 3.4–5)
ALP LIVER SERPL-CCNC: 64 U/L
ALT SERPL-CCNC: 31 U/L
AST SERPL-CCNC: 19 U/L (ref 15–37)
BASOPHILS # BLD AUTO: 0.1 X10(3) UL (ref 0–0.2)
BASOPHILS NFR BLD AUTO: 1.3 %
BILIRUB DIRECT SERPL-MCNC: 0.1 MG/DL (ref 0–0.2)
BILIRUB SERPL-MCNC: 0.4 MG/DL (ref 0.1–2)
BUN BLD-MCNC: 11 MG/DL (ref 7–18)
CREAT BLD-MCNC: 0.85 MG/DL
CRP SERPL-MCNC: <0.29 MG/DL (ref ?–0.3)
EOSINOPHIL # BLD AUTO: 0.19 X10(3) UL (ref 0–0.7)
EOSINOPHIL NFR BLD AUTO: 2.4 %
ERYTHROCYTE [DISTWIDTH] IN BLOOD BY AUTOMATED COUNT: 13.2 %
HCT VFR BLD AUTO: 44.3 %
HGB BLD-MCNC: 13.8 G/DL
IMM GRANULOCYTES # BLD AUTO: 0.03 X10(3) UL (ref 0–1)
IMM GRANULOCYTES NFR BLD: 0.4 %
LYMPHOCYTES # BLD AUTO: 2.17 X10(3) UL (ref 1–4)
LYMPHOCYTES NFR BLD AUTO: 27.7 %
M PROTEIN MFR SERPL ELPH: 7.5 G/DL (ref 6.4–8.2)
MCH RBC QN AUTO: 28 PG (ref 26–34)
MCHC RBC AUTO-ENTMCNC: 31.2 G/DL (ref 31–37)
MCV RBC AUTO: 90 FL
MONOCYTES # BLD AUTO: 0.6 X10(3) UL (ref 0.1–1)
MONOCYTES NFR BLD AUTO: 7.7 %
NEUTROPHILS # BLD AUTO: 4.75 X10 (3) UL (ref 1.5–7.7)
NEUTROPHILS # BLD AUTO: 4.75 X10(3) UL (ref 1.5–7.7)
NEUTROPHILS NFR BLD AUTO: 60.5 %
PLATELET # BLD AUTO: 365 10(3)UL (ref 150–450)
RBC # BLD AUTO: 4.92 X10(6)UL
SED RATE-ML: 9 MM/HR
WBC # BLD AUTO: 7.8 X10(3) UL (ref 4–11)

## 2021-09-11 PROCEDURE — 85652 RBC SED RATE AUTOMATED: CPT

## 2021-09-11 PROCEDURE — 80076 HEPATIC FUNCTION PANEL: CPT

## 2021-09-11 PROCEDURE — 86140 C-REACTIVE PROTEIN: CPT

## 2021-09-11 PROCEDURE — 84520 ASSAY OF UREA NITROGEN: CPT

## 2021-09-11 PROCEDURE — 36415 COLL VENOUS BLD VENIPUNCTURE: CPT

## 2021-09-11 PROCEDURE — 82565 ASSAY OF CREATININE: CPT

## 2021-09-11 PROCEDURE — 85025 COMPLETE CBC W/AUTO DIFF WBC: CPT

## 2021-09-12 NOTE — PROGRESS NOTES
[employer] message sent to patient. Monitoring labs are stable. Continue current treatment plan. Repeat labs every 3 months. Follow up as planned.

## 2021-10-07 ENCOUNTER — ORDER TRANSCRIPTION (OUTPATIENT)
Dept: ADMINISTRATIVE | Facility: HOSPITAL | Age: 51
End: 2021-10-07

## 2021-10-07 DIAGNOSIS — Z13.6 SCREENING FOR CARDIOVASCULAR CONDITION: Primary | ICD-10-CM

## 2021-11-09 ENCOUNTER — HOSPITAL ENCOUNTER (OUTPATIENT)
Dept: CT IMAGING | Age: 51
Discharge: HOME | End: 2021-11-09
Attending: INTERNAL MEDICINE

## 2021-11-09 DIAGNOSIS — Z13.6 SCREENING FOR CARDIOVASCULAR CONDITION: ICD-10-CM

## 2021-11-12 ENCOUNTER — ERX REFILL RESPONSE (OUTPATIENT)
Dept: URBAN - METROPOLITAN AREA CLINIC 44 | Facility: CLINIC | Age: 51
End: 2021-11-12

## 2021-11-12 RX ORDER — DICYCLOMINE HYDROCHLORIDE 10 MG/1
TAKE 1 CAPSULE BY MOUTH EVERY DAY CAPSULE ORAL
Qty: 30 CAPSULE | Refills: 4 | OUTPATIENT

## 2021-11-12 RX ORDER — DICYCLOMINE HYDROCHLORIDE 10 MG/1
CAPSULE ORAL
OUTPATIENT

## 2021-12-16 ENCOUNTER — LAB ENCOUNTER (OUTPATIENT)
Dept: LAB | Age: 51
End: 2021-12-16
Attending: INTERNAL MEDICINE
Payer: COMMERCIAL

## 2021-12-16 DIAGNOSIS — M06.00 SERONEGATIVE RHEUMATOID ARTHRITIS (HCC): ICD-10-CM

## 2021-12-16 DIAGNOSIS — E07.9 THYROID DYSFUNCTION: ICD-10-CM

## 2021-12-16 DIAGNOSIS — Z79.899 ENCOUNTER FOR LONG-TERM (CURRENT) USE OF HIGH-RISK MEDICATION: ICD-10-CM

## 2021-12-16 PROCEDURE — 82565 ASSAY OF CREATININE: CPT

## 2021-12-16 PROCEDURE — 36415 COLL VENOUS BLD VENIPUNCTURE: CPT

## 2021-12-16 PROCEDURE — 80076 HEPATIC FUNCTION PANEL: CPT

## 2021-12-16 PROCEDURE — 86140 C-REACTIVE PROTEIN: CPT

## 2021-12-16 PROCEDURE — 85652 RBC SED RATE AUTOMATED: CPT

## 2021-12-16 PROCEDURE — 84443 ASSAY THYROID STIM HORMONE: CPT

## 2021-12-16 PROCEDURE — 85025 COMPLETE CBC W/AUTO DIFF WBC: CPT

## 2021-12-16 PROCEDURE — 84520 ASSAY OF UREA NITROGEN: CPT

## 2021-12-16 PROCEDURE — 84439 ASSAY OF FREE THYROXINE: CPT

## 2021-12-17 NOTE — PROGRESS NOTES
Monitoring labs are stable. Continue current treatment plan. Repeat labs every 3 months. Follow up as planned.

## 2022-01-04 NOTE — PROGRESS NOTES
Called pt at [phone],reached identified recorder,per hippa consent,message left. Order entered already by [facility] A [facility]

## 2022-03-15 ENCOUNTER — LAB ENCOUNTER (OUTPATIENT)
Dept: LAB | Age: 52
End: 2022-03-15
Attending: FAMILY MEDICINE
Payer: COMMERCIAL

## 2022-03-15 DIAGNOSIS — E78.5 HYPERLIPEMIA: Primary | ICD-10-CM

## 2022-03-15 DIAGNOSIS — Z79.899 ENCOUNTER FOR LONG-TERM (CURRENT) USE OF HIGH-RISK MEDICATION: ICD-10-CM

## 2022-03-15 DIAGNOSIS — M06.00 SERONEGATIVE RHEUMATOID ARTHRITIS (HCC): ICD-10-CM

## 2022-03-15 DIAGNOSIS — I10 BENIGN HYPERTENSION: ICD-10-CM

## 2022-03-15 LAB
ALBUMIN SERPL-MCNC: 3.9 G/DL (ref 3.4–5)
ALBUMIN/GLOB SERPL: 1.1 {RATIO} (ref 1–2)
ALP LIVER SERPL-CCNC: 66 U/L
ALT SERPL-CCNC: 26 U/L
ANION GAP SERPL CALC-SCNC: 3 MMOL/L (ref 0–18)
AST SERPL-CCNC: 15 U/L (ref 15–37)
BASOPHILS # BLD AUTO: 0.12 X10(3) UL (ref 0–0.2)
BASOPHILS NFR BLD AUTO: 1.4 %
BILIRUB DIRECT SERPL-MCNC: 0.1 MG/DL (ref 0–0.2)
BILIRUB SERPL-MCNC: 0.6 MG/DL (ref 0.1–2)
BUN BLD-MCNC: 14 MG/DL (ref 7–18)
CALCIUM BLD-MCNC: 9.5 MG/DL (ref 8.5–10.1)
CHLORIDE SERPL-SCNC: 104 MMOL/L (ref 98–112)
CHOLEST SERPL-MCNC: 192 MG/DL (ref ?–200)
CO2 SERPL-SCNC: 29 MMOL/L (ref 21–32)
CREAT BLD-MCNC: 0.85 MG/DL
CRP SERPL-MCNC: <0.29 MG/DL (ref ?–0.3)
EOSINOPHIL # BLD AUTO: 0.13 X10(3) UL (ref 0–0.7)
EOSINOPHIL NFR BLD AUTO: 1.5 %
ERYTHROCYTE [DISTWIDTH] IN BLOOD BY AUTOMATED COUNT: 13.3 %
ERYTHROCYTE [SEDIMENTATION RATE] IN BLOOD: 9 MM/HR
FASTING PATIENT LIPID ANSWER: YES
FASTING STATUS PATIENT QL REPORTED: YES
GLOBULIN PLAS-MCNC: 3.4 G/DL (ref 2.8–4.4)
GLUCOSE BLD-MCNC: 107 MG/DL (ref 70–99)
HCT VFR BLD AUTO: 45.6 %
HDLC SERPL-MCNC: 50 MG/DL (ref 40–59)
HGB BLD-MCNC: 14.8 G/DL
IMM GRANULOCYTES # BLD AUTO: 0.02 X10(3) UL (ref 0–1)
IMM GRANULOCYTES NFR BLD: 0.2 %
LDLC SERPL CALC-MCNC: 103 MG/DL (ref ?–100)
LYMPHOCYTES # BLD AUTO: 2.03 X10(3) UL (ref 1–4)
LYMPHOCYTES NFR BLD AUTO: 23.8 %
MCH RBC QN AUTO: 28.6 PG (ref 26–34)
MCHC RBC AUTO-ENTMCNC: 32.5 G/DL (ref 31–37)
MCV RBC AUTO: 88 FL
MONOCYTES # BLD AUTO: 0.6 X10(3) UL (ref 0.1–1)
MONOCYTES NFR BLD AUTO: 7 %
NEUTROPHILS # BLD AUTO: 5.62 X10 (3) UL (ref 1.5–7.7)
NEUTROPHILS # BLD AUTO: 5.62 X10(3) UL (ref 1.5–7.7)
NEUTROPHILS NFR BLD AUTO: 66.1 %
NONHDLC SERPL-MCNC: 142 MG/DL (ref ?–130)
OSMOLALITY SERPL CALC.SUM OF ELEC: 283 MOSM/KG (ref 275–295)
PLATELET # BLD AUTO: 338 10(3)UL (ref 150–450)
POTASSIUM SERPL-SCNC: 4.6 MMOL/L (ref 3.5–5.1)
PROT SERPL-MCNC: 7.3 G/DL (ref 6.4–8.2)
RBC # BLD AUTO: 5.18 X10(6)UL
SODIUM SERPL-SCNC: 136 MMOL/L (ref 136–145)
TRIGL SERPL-MCNC: 231 MG/DL (ref 30–149)
VLDLC SERPL CALC-MCNC: 39 MG/DL (ref 0–30)
WBC # BLD AUTO: 8.5 X10(3) UL (ref 4–11)

## 2022-03-15 PROCEDURE — 80061 LIPID PANEL: CPT

## 2022-03-15 PROCEDURE — 85025 COMPLETE CBC W/AUTO DIFF WBC: CPT

## 2022-03-15 PROCEDURE — 85652 RBC SED RATE AUTOMATED: CPT

## 2022-03-15 PROCEDURE — 82248 BILIRUBIN DIRECT: CPT

## 2022-03-15 PROCEDURE — 80053 COMPREHEN METABOLIC PANEL: CPT

## 2022-03-15 PROCEDURE — 36415 COLL VENOUS BLD VENIPUNCTURE: CPT

## 2022-03-15 PROCEDURE — 86140 C-REACTIVE PROTEIN: CPT

## 2022-03-21 ENCOUNTER — ERX REFILL RESPONSE (OUTPATIENT)
Dept: URBAN - METROPOLITAN AREA CLINIC 46 | Facility: CLINIC | Age: 52
End: 2022-03-21

## 2022-03-21 RX ORDER — LUBIPROSTONE 8 UG/1
TAKE 1 CAPSULE BY MOUTH TWICE A DAY WITH FOOD OR WATER CAPSULE, GELATIN COATED ORAL
Qty: 60 CAPSULE | Refills: 4 | OUTPATIENT

## 2022-03-21 RX ORDER — LUBIPROSTONE 8 UG/1
TAKE 1 CAPSULE BY MOUTH TWICE A DAY WITH FOOD OR WATER CAPSULE, GELATIN COATED ORAL
Qty: 60 CAPSULE | Refills: 12 | OUTPATIENT

## 2022-12-14 NOTE — TELEPHONE ENCOUNTER
Pt states rheum wants her to have pneum and flu shot.  Please order pneum and we can call her to schedule, she wants to get in tomorrow 14-Dec-2021 04-Dec-2021

## 2023-03-14 PROBLEM — E78.01 ESSENTIAL FAMILIAL HYPERCHOLESTEROLEMIA: Status: ACTIVE | Noted: 2017-05-30

## 2023-03-14 PROBLEM — E78.1 HYPERTRIGLYCERIDEMIA: Status: ACTIVE | Noted: 2017-05-30

## 2023-03-14 PROBLEM — U07.1 ACUTE COVID-19: Status: ACTIVE | Noted: 2021-12-01

## 2023-03-14 PROBLEM — I10 HTN, GOAL BELOW 140/90: Status: ACTIVE | Noted: 2017-07-14

## 2023-09-28 ENCOUNTER — LAB ENCOUNTER (OUTPATIENT)
Dept: LAB | Age: 53
End: 2023-09-28
Attending: INTERNAL MEDICINE
Payer: COMMERCIAL

## 2023-09-28 DIAGNOSIS — E78.01 FAMILIAL HYPERCHOLESTEROLEMIA: ICD-10-CM

## 2023-09-28 DIAGNOSIS — I10 ESSENTIAL HYPERTENSION, MALIGNANT: ICD-10-CM

## 2023-09-28 DIAGNOSIS — Z82.49 FAMILY HISTORY OF ISCHEMIC HEART DISEASE: ICD-10-CM

## 2023-09-28 DIAGNOSIS — R73.03 BORDERLINE DIABETES: ICD-10-CM

## 2023-09-28 DIAGNOSIS — I25.10 CORONARY ATHEROSCLEROSIS OF NATIVE CORONARY ARTERY: Primary | ICD-10-CM

## 2023-09-28 DIAGNOSIS — E78.1 PURE HYPERGLYCERIDEMIA: ICD-10-CM

## 2023-09-28 LAB
ALBUMIN SERPL-MCNC: 3.6 G/DL (ref 3.4–5)
ALBUMIN/GLOB SERPL: 0.9 {RATIO} (ref 1–2)
ALP LIVER SERPL-CCNC: 63 U/L
ALT SERPL-CCNC: 25 U/L
ANION GAP SERPL CALC-SCNC: 3 MMOL/L (ref 0–18)
AST SERPL-CCNC: 18 U/L (ref 15–37)
BILIRUB SERPL-MCNC: 0.4 MG/DL (ref 0.1–2)
BUN BLD-MCNC: 12 MG/DL (ref 7–18)
CALCIUM BLD-MCNC: 9.4 MG/DL (ref 8.5–10.1)
CHLORIDE SERPL-SCNC: 107 MMOL/L (ref 98–112)
CHOLEST SERPL-MCNC: 158 MG/DL (ref ?–200)
CO2 SERPL-SCNC: 28 MMOL/L (ref 21–32)
CREAT BLD-MCNC: 0.93 MG/DL
EGFRCR SERPLBLD CKD-EPI 2021: 74 ML/MIN/1.73M2 (ref 60–?)
FASTING PATIENT LIPID ANSWER: YES
FASTING STATUS PATIENT QL REPORTED: YES
GLOBULIN PLAS-MCNC: 4 G/DL (ref 2.8–4.4)
GLUCOSE BLD-MCNC: 109 MG/DL (ref 70–99)
HDLC SERPL-MCNC: 51 MG/DL (ref 40–59)
LDLC SERPL CALC-MCNC: 75 MG/DL (ref ?–100)
NONHDLC SERPL-MCNC: 107 MG/DL (ref ?–130)
OSMOLALITY SERPL CALC.SUM OF ELEC: 286 MOSM/KG (ref 275–295)
POTASSIUM SERPL-SCNC: 4.1 MMOL/L (ref 3.5–5.1)
PROT SERPL-MCNC: 7.6 G/DL (ref 6.4–8.2)
SODIUM SERPL-SCNC: 138 MMOL/L (ref 136–145)
TRIGL SERPL-MCNC: 189 MG/DL (ref 30–149)
VLDLC SERPL CALC-MCNC: 29 MG/DL (ref 0–30)

## 2023-09-28 PROCEDURE — 80053 COMPREHEN METABOLIC PANEL: CPT

## 2023-09-28 PROCEDURE — 36415 COLL VENOUS BLD VENIPUNCTURE: CPT

## 2023-09-28 PROCEDURE — 80061 LIPID PANEL: CPT

## 2024-06-04 ENCOUNTER — OFFICE VISIT (OUTPATIENT)
Dept: URBAN - METROPOLITAN AREA CLINIC 115 | Facility: CLINIC | Age: 54
End: 2024-06-04
Payer: COMMERCIAL

## 2024-06-04 ENCOUNTER — DASHBOARD ENCOUNTERS (OUTPATIENT)
Age: 54
End: 2024-06-04

## 2024-06-04 VITALS
BODY MASS INDEX: 20.66 KG/M2 | DIASTOLIC BLOOD PRESSURE: 88 MMHG | TEMPERATURE: 98 F | HEIGHT: 65 IN | HEART RATE: 86 BPM | SYSTOLIC BLOOD PRESSURE: 136 MMHG | WEIGHT: 124 LBS

## 2024-06-04 DIAGNOSIS — K58.1 IRRITABLE BOWEL SYNDROME WITH CONSTIPATION: ICD-10-CM

## 2024-06-04 DIAGNOSIS — R19.4 CHANGE IN BOWEL HABITS: ICD-10-CM

## 2024-06-04 PROBLEM — 440630006: Status: ACTIVE | Noted: 2024-06-04

## 2024-06-04 PROCEDURE — 99244 OFF/OP CNSLTJ NEW/EST MOD 40: CPT

## 2024-06-04 PROCEDURE — 99214 OFFICE O/P EST MOD 30 MIN: CPT

## 2024-06-04 RX ORDER — POLYETHYLENE GLYCOL-3350 AND ELECTROLYTES WITH FLAVOR PACK 240; 5.84; 2.98; 6.72; 22.72 G/278.26G; G/278.26G; G/278.26G; G/278.26G; G/278.26G
4000ML POWDER, FOR SOLUTION ORAL 1
Qty: 4000 MILLILITER | Refills: 0 | OUTPATIENT
Start: 2024-06-04 | End: 2024-06-05

## 2024-06-04 RX ORDER — LUBIPROSTONE 8 UG/1
TAKE 1 CAPSULE BY MOUTH TWICE A DAY WITH FOOD OR WATER CAPSULE, GELATIN COATED ORAL
Qty: 60 CAPSULE | Refills: 12 | Status: DISCONTINUED | COMMUNITY

## 2024-06-04 RX ORDER — DICYCLOMINE HYDROCHLORIDE 10 MG/1
TAKE 1 CAPSULE BY MOUTH EVERY DAY CAPSULE ORAL
Qty: 30 CAPSULE | Refills: 4 | Status: ACTIVE | COMMUNITY

## 2024-06-04 RX ORDER — RIFAXIMIN 550 MG/1
TABLET ORAL
Status: DISCONTINUED | COMMUNITY
Start: 2021-06-25

## 2024-06-04 RX ORDER — LUBIPROSTONE 8 UG/1
1 CAPSULE WITH FOOD AND WATER CAPSULE, GELATIN COATED ORAL TWICE A DAY
Qty: 60 | Refills: 1 | OUTPATIENT
Start: 2024-06-04 | End: 2024-08-03

## 2024-06-04 NOTE — HPI-TODAY'S VISIT:
52 y/o F with PMH of IBS-D (previously tx'd with Xifaxan) presents on referral from Dr. Reanna Holley for colon consult. A copy of this note will be sent to referring provider.  States that she has been irregular in her BMs for the past 6 mos, does not have daily BMs, sometimes it's smaller vs. bigger, sometimes incomplete evacuation, there is a groove sometimes in the stool, has to keep wiping to get clean; consistency of the stool varies.  Usually type 6, sometimes type 4 stools. Sometimes with low abdominal pain prior to BMs. Takes Miralax 1 cap on weekdays. Reports sporadic heartburn when she is on her cycle. Takes dicyclomine as needed for abdominal pain or when out in social gatherings. Denies n/v, blood in stool, dysphagia, odynophagia, unintentional weight loss, change in appetite, early satiety. Takes Advil prn. Rare EtOH (~3 drinks a year). Denies tobacco/marijuana use.  Last colonoscopy: 2021 showed grade 2 int hemorrhoids, neg random bx's; repeat in 5Y  FHx of colon cancer in father (dx'd at age ~64, was dx'd late, s/p colon resection with colostomy). Denies PMH of MI, stroke, seizures, BiPAP use, pacemaker/defibrillator, blood thinners, ESRD. States she has a heart flutter "pre-beat" that is being monitored by a cardiologist (Dr. Chandler Guevara), but is benign.

## 2024-06-26 ENCOUNTER — WEB ENCOUNTER (OUTPATIENT)
Dept: URBAN - METROPOLITAN AREA CLINIC 115 | Facility: CLINIC | Age: 54
End: 2024-06-26

## 2024-07-15 ENCOUNTER — WEB ENCOUNTER (OUTPATIENT)
Dept: URBAN - METROPOLITAN AREA CLINIC 115 | Facility: CLINIC | Age: 54
End: 2024-07-15

## 2024-07-23 ENCOUNTER — OFFICE VISIT (OUTPATIENT)
Dept: URBAN - METROPOLITAN AREA SURGERY CENTER 13 | Facility: SURGERY CENTER | Age: 54
End: 2024-07-23

## 2024-08-26 ENCOUNTER — OFFICE VISIT (OUTPATIENT)
Dept: URBAN - METROPOLITAN AREA SURGERY CENTER 13 | Facility: SURGERY CENTER | Age: 54
End: 2024-08-26

## 2024-09-03 ENCOUNTER — OFFICE VISIT (OUTPATIENT)
Dept: URBAN - METROPOLITAN AREA CLINIC 115 | Facility: CLINIC | Age: 54
End: 2024-09-03

## 2024-09-10 ENCOUNTER — ERX REFILL RESPONSE (OUTPATIENT)
Dept: URBAN - METROPOLITAN AREA CLINIC 115 | Facility: CLINIC | Age: 54
End: 2024-09-10

## 2024-09-10 RX ORDER — LUBIPROSTONE 8 UG/1
1 CAPSULE WITH FOOD AND WATER ORALLY TWICE A DAY 30 DAYS CAPSULE, GELATIN COATED ORAL
Qty: 60 CAPSULE | Refills: 2 | OUTPATIENT

## 2024-09-10 RX ORDER — LUBIPROSTONE 8 UG/1
1 CAPSULE WITH FOOD AND WATER ORALLY TWICE A DAY 30 DAYS CAPSULE, GELATIN COATED ORAL
Qty: 60 CAPSULE | Refills: 1 | OUTPATIENT

## 2024-10-22 ENCOUNTER — OFFICE VISIT (OUTPATIENT)
Dept: URBAN - METROPOLITAN AREA SURGERY CENTER 13 | Facility: SURGERY CENTER | Age: 54
End: 2024-10-22

## 2024-11-05 ENCOUNTER — OFFICE VISIT (OUTPATIENT)
Dept: URBAN - METROPOLITAN AREA CLINIC 115 | Facility: CLINIC | Age: 54
End: 2024-11-05

## 2025-04-30 NOTE — PROGRESS NOTES
Date of Service 4/30/2025    YESSI WHITAKER  Date of Birth 7/5/1970    Patient Age: 54 year old    PCP: Dirk Sandoval MD  [address]   [address]  [facility] [address]    Heart Scan Consult  Preliminary Heart Scan Score: 79.3    Previous Screening  Heart Scan Completed Previously: Yes  Year of last heart scan: 2019  Score of last heart scan: 12  Peripheral Vascular Scan Completed Previously: No          Risk Factors  Personal Risk Factors  Non-alterable Risk Factors: Personal History, Family History, Age  Alterable Risk Factors: High Blood Pressure, Abnormal Cholesterol, Lack of exercise, Obesity, Unhealthy eating      Body Mass Index  Body mass index is 37.07 kg/m².    Blood Pressure  Blood Pressure measurement declined during this encounter.  (Normal =< 120/80,  Elevated = 120-129/ >80,  High Stage1 130-139/80-89 , Stage2 >140/>90)    Lipid Profile  Patient was in fasting state: Yes    Cholesterol: 176, done on 4/30/2025.  HDL Cholesterol: 51, done on 4/30/2025.  LDL Cholesterol: 65, done on 4/30/2025.  TriGlycerides 295, done on 4/30/2025.    Cholesterol Goals  Value   Total  =< 200   HDL  = > 45 Men = > 55 Women   LDL   =< 100   Triglycerides  =< 150       Glucose and Hemoglobin A1C  Lab Results   Component Value Date    GLU 99 04/30/2025    A1C 5.5 01/26/2018     (Normal Fasting Glucose < 100mg/dl )    Nurse Review  Risk factor information and results reviewed with Nurse: Yes    Recommended Follow Up:  Consult your physician regarding:: Final Heart Scan Report, Discuss potential for Incidental Finding, Discuss Potential for Score Variance      Recommendations for Change:  Nutrition Changes: Low Saturated Fat, Increase Fiber, Low Fat Dairy, Low Salt Eating (Eats chicken, beef 3 times per week.. Encouraged to use ground chicken or turkey as well. Eats eggs daily and encouraged to alternate with oatmeal and add flax. Encouraged to switch from ham and salami sandwiches to turkey. No dairy.)    Cholesterol  Modification (goal of therapy depends upon your risk): Decrease Triglycerides (Ugly) Normal <150, Increase HDL (Healthy/Good) Normal >45 Men >55 Women    Exercise: Initiate Program (Encouraged to make attainable goals to reach 150 minutes of aerobic exercise.)    Smoking Cessation: No Change Needed    Weight Management: Decrease Current Weight    Stress Management: Adopt Stress Management Techniques    Repeat Heart Scan: Discuss with your Physician, 3 Years if Calcium Score is > 0.0              [place] Recommended Resources:  Recommended Resources: Upcoming Classes, Medical Services and Health Library [email or web address], [facility] [phone] (EDW), [employer] [phone]        Final heart scan score may take 2 weeks.    Martha RAMAN RN        Please Contact the Nurse Heart Line with any Questions or Concerns [phone].

## (undated) NOTE — MR AVS SNAPSHOT
[facility] [address]  [address] [facility]  [phone]               Thank you for choosing us for your health care visit with Martin Jean Baptiste MD.  We are glad to serve you and happy to provide you with t (BMI 30-39. 9) [E66.9], Prediabetes [R73.03], Vitamin D deficiency [E55.9], Pure hypercholesterolemia [E78.00], Routine lab draw [Z00.00]                 Follow-up Instructions     Return in about 4 weeks (around 6/12/2017).          Reason for Today's Visit MyChart     Visit Pradama  You can access your MyChart to more actively manage your health care and view more details from this visit by going to [email or web address].   If you've recently had a stay at the Hospital you can access your discharge ins

## (undated) NOTE — MR AVS SNAPSHOT
[facility] [address]  [address] [facility] Estefani Max  [phone]               Thank you for choosing us for your health care visit with Haylie Huff MD.  We are glad to serve you and happy to provide you with t Reason for Today's Visit     Weight Check           Medical Issues Discussed Today     Encounter for therapeutic drug monitoring    Hyperlipidemia    Obesity (BMI 30-39. 9)    Prediabetes    Vitamin D deficiency    Vitamin B 12 deficiency          Instructi If you've recently had a stay at the Hospital you can access your discharge instructions in [employer] by going to Visits < Admission Summaries.  If you've been to the Emergency Department or your doctor's office, you can view your past visit information in My

## (undated) NOTE — MR AVS SNAPSHOT
[facility] [address]  [address] [facility] Wesly Lambertels  [phone]               Thank you for choosing us for your health care visit with Rashad Ruelas MD.  We are glad to serve you and happy to provide you with t Commonly known as:  ADIPEX-P           topiramate 50 MG Tabs   Take 1 tablet (50 mg total) by mouth 2 (two) times daily. Commonly known as:  TOPAMAX           Valsartan-Hydrochlorothiazide 320-25 MG Tabs   Take 1 tablet by mouth daily.                 Whe

## (undated) NOTE — MR AVS SNAPSHOT
[facility] [address] [facility] Shannan Gibbons  [phone]               Thank you for choosing us for your health care visit with Johny Brown MD.  We are glad to serve you and happy to provide you with t What changed:  See the new instructions. Commonly known as:  GLUCOPHAGE-XR           * Phentermine HCl 37.5 MG Tabs   Take 1 tablet (37.5 mg total) by mouth every morning before breakfast.   What changed:  Another medication with the same name was added. office, you can view your past visit information in [facility] by going to Visits < Visit Summaries. Enish questions? Call [phone] for help. Enish is NOT to be used for urgent needs. For medical emergencies, dial 911.         Educational Inform

## (undated) NOTE — LETTER
12/11/17        [address] [facility] [address]      Dear Leilani Mcleod,    [address] [facility] records indicate that you have outstanding lab work and or testing that was ordered for you and has not yet been completed:          Hemoglobin A1C [E]